# Patient Record
Sex: FEMALE | Race: WHITE | NOT HISPANIC OR LATINO | Employment: UNEMPLOYED | URBAN - METROPOLITAN AREA
[De-identification: names, ages, dates, MRNs, and addresses within clinical notes are randomized per-mention and may not be internally consistent; named-entity substitution may affect disease eponyms.]

---

## 2017-07-17 ENCOUNTER — PROCEDURE VISIT (OUTPATIENT)
Dept: OBSTETRICS AND GYNECOLOGY | Facility: CLINIC | Age: 22
End: 2017-07-17

## 2017-07-17 ENCOUNTER — OFFICE VISIT (OUTPATIENT)
Dept: OBSTETRICS AND GYNECOLOGY | Facility: CLINIC | Age: 22
End: 2017-07-17

## 2017-07-17 VITALS
DIASTOLIC BLOOD PRESSURE: 74 MMHG | SYSTOLIC BLOOD PRESSURE: 112 MMHG | HEIGHT: 65 IN | BODY MASS INDEX: 27.69 KG/M2 | WEIGHT: 166.2 LBS

## 2017-07-17 DIAGNOSIS — Z13.9 SCREENING: Primary | ICD-10-CM

## 2017-07-17 DIAGNOSIS — O36.80X0 ENCOUNTER TO DETERMINE FETAL VIABILITY OF PREGNANCY, NOT APPLICABLE OR UNSPECIFIED FETUS: Primary | ICD-10-CM

## 2017-07-17 DIAGNOSIS — N91.2 AMENORRHEA: ICD-10-CM

## 2017-07-17 LAB
B-HCG UR QL: POSITIVE
BILIRUB BLD-MCNC: NEGATIVE MG/DL
CLARITY, POC: CLEAR
COLOR UR: YELLOW
GLUCOSE UR STRIP-MCNC: ABNORMAL MG/DL
HCG INTACT+B SERPL-ACNC: 855.5 MIU/ML
INTERNAL NEGATIVE CONTROL: NEGATIVE
INTERNAL POSITIVE CONTROL: POSITIVE
KETONES UR QL: NEGATIVE
LEUKOCYTE EST, POC: NEGATIVE
Lab: ABNORMAL
NITRITE UR-MCNC: NEGATIVE MG/ML
PH UR: 5 [PH] (ref 5–8)
PROT UR STRIP-MCNC: NEGATIVE MG/DL
RBC # UR STRIP: NEGATIVE /UL
SP GR UR: 1 (ref 1–1.03)
UROBILINOGEN UR QL: NORMAL

## 2017-07-17 PROCEDURE — 99213 OFFICE O/P EST LOW 20 MIN: CPT | Performed by: NURSE PRACTITIONER

## 2017-07-17 PROCEDURE — 81025 URINE PREGNANCY TEST: CPT | Performed by: NURSE PRACTITIONER

## 2017-07-17 PROCEDURE — 76817 TRANSVAGINAL US OBSTETRIC: CPT | Performed by: NURSE PRACTITIONER

## 2017-07-17 RX ORDER — PRENATAL VIT NO.126/IRON/FOLIC 28MG-0.8MG
TABLET ORAL DAILY
COMMUNITY
End: 2018-03-02

## 2017-07-17 NOTE — PROGRESS NOTES
"Confirmation of pregnancy     .  Chief Complaint   Patient presents with   • Amenorrhea         Yasmin Escalante is being seen today for her first obstetrical visit.  She is a 22 y.o.   Gestational Age: <None> gestation. [facility] 17.     Current obstetric complaints : fatigue, \" not feeling like self\", breast tenderness, nausea and hot flashes   Prior obstetric issues, potential pregnancy concerns: h/o complete AB   Family history of genetic issues (includes FOB):denies   Prior infections concerning in pregnancy (Rash, fever in last 2 weeks): denies   Varicella Hx -as child  Flu vaccine- no   Prior testing for Cystic Fibrosis Carrier or Sickle Cell Trait- no  Prepregnancy BMI - Body mass index is 27.66 kg/(m^2).    History reviewed. No pertinent past medical history.    Past Surgical History:   Procedure Laterality Date   • CHOLECYSTECTOMY     • WISDOM TOOTH EXTRACTION           Current Outpatient Prescriptions:   •  Prenatal Vit-Fe Fumarate-FA (PRENATAL, CLASSIC, VITAMIN) 28-0.8 MG tablet tablet, Take  by mouth Daily., Disp: , Rfl:     Allergies   Allergen Reactions   • Corticosteroids Mental Status Change       Social History     Social History   • Marital status: Single     Spouse name: N/A   • Number of children: N/A   • Years of education: N/A     Occupational History   • Not on file.     Social History Main Topics   • Smoking status: Never Smoker   • Smokeless tobacco: Not on file   • Alcohol use No   • Drug use: No   • Sexual activity: Yes     Partners: Male     Other Topics Concern   • Not on file     Social History Narrative   • No narrative on file       History reviewed. No pertinent family history.    Review of systems     A comprehensive review of systems was negative except for: Constitutional: positive for fatigue and \"not feeling like self\"  Gastrointestinal: positive for nausea  Integument/breast: positive for breast tenderness  Endocrine: positive for temperature " "intolerance    Objective    /74  Ht 65\" (165.1 cm)  Wt 166 lb 3.2 oz (75.4 kg)  LMP 06/07/2017 (Exact Date)  Breastfeeding? No  BMI 27.66 kg/m2      Assessment    1) Amenorrhea- +UPT. TVUS= No gestational sac noted. (L) ovarian cyst. Thickened endometrial lining. Check quant Hcg and repeat lab in 48 hours. Warn s/s disc.  2) Nausea- disc ways to decrease N/V   3) History of SAB    Plan    Patient is on Prenatal vitamins  Problem list reviewed and updated.  Reviewed routine prenatal care with the patient  Zika (travel restrictions/ok to use insect repellant), not to changing cat litter, food restrictions, avoidance of alcohol, tobacco and drugs and saunas/hot tubs.   All questions answered.     Encounter Diagnoses   Name Primary?   • Screening Yes   • Amenorrhea        Diagnoses and all orders for this visit:    Screening  -     POC Pregnancy, Urine  -     POC Urinalysis Dipstick    Amenorrhea  -     HCG, B-subunit, Quantitative    Other orders  -     Prenatal Vit-Fe Fumarate-FA (PRENATAL, CLASSIC, VITAMIN) 28-0.8 MG tablet tablet; Take  by mouth Daily.      Mi Alcazar, APRN  7/17/2017  4:08 PM      "

## 2017-07-27 PROBLEM — N91.2 AMENORRHEA: Status: ACTIVE | Noted: 2017-07-27

## 2017-07-31 ENCOUNTER — PROCEDURE VISIT (OUTPATIENT)
Dept: OBSTETRICS AND GYNECOLOGY | Facility: CLINIC | Age: 22
End: 2017-07-31

## 2017-07-31 ENCOUNTER — TELEPHONE (OUTPATIENT)
Dept: OBSTETRICS AND GYNECOLOGY | Facility: CLINIC | Age: 22
End: 2017-07-31

## 2017-07-31 ENCOUNTER — ROUTINE PRENATAL (OUTPATIENT)
Dept: OBSTETRICS AND GYNECOLOGY | Facility: CLINIC | Age: 22
End: 2017-07-31

## 2017-07-31 VITALS — DIASTOLIC BLOOD PRESSURE: 70 MMHG | BODY MASS INDEX: 27.46 KG/M2 | WEIGHT: 165 LBS | SYSTOLIC BLOOD PRESSURE: 110 MMHG

## 2017-07-31 DIAGNOSIS — O36.80X0 ENCOUNTER TO DETERMINE FETAL VIABILITY OF PREGNANCY, NOT APPLICABLE OR UNSPECIFIED FETUS: Primary | ICD-10-CM

## 2017-07-31 DIAGNOSIS — Z3A.01 LESS THAN 8 WEEKS GESTATION OF PREGNANCY: Primary | ICD-10-CM

## 2017-07-31 DIAGNOSIS — O21.9 NAUSEA AND VOMITING DURING PREGNANCY PRIOR TO 22 WEEKS GESTATION: ICD-10-CM

## 2017-07-31 PROBLEM — O09.299 HISTORY OF MISCARRIAGE, CURRENTLY PREGNANT: Status: ACTIVE | Noted: 2017-07-31

## 2017-07-31 LAB
EXTERNAL ABO GROUPING: NORMAL
EXTERNAL CHLAMYDIA SCREEN: NEGATIVE
EXTERNAL CYSTIC FIBROSIS: NEGATIVE
EXTERNAL GC/CHLAMYDIA: NORMAL
EXTERNAL GONORRHEA SCREEN: NEGATIVE
EXTERNAL HEPATITIS B SURFACE ANTIGEN: NEGATIVE
EXTERNAL HEPATITIS C AB: NEGATIVE
EXTERNAL RH FACTOR: POSITIVE
EXTERNAL RUBELLA QUALITATIVE: NORMAL
EXTERNAL SYPHILIS RPR SCREEN: NORMAL
EXTERNAL URINE CULTURE: NORMAL
HIV1 P24 AG SERPL QL IA: NEGATIVE

## 2017-07-31 PROCEDURE — 76817 TRANSVAGINAL US OBSTETRIC: CPT | Performed by: NURSE PRACTITIONER

## 2017-07-31 PROCEDURE — 99213 OFFICE O/P EST LOW 20 MIN: CPT | Performed by: NURSE PRACTITIONER

## 2017-07-31 RX ORDER — ONDANSETRON 4 MG/1
4 TABLET, FILM COATED ORAL EVERY 8 HOURS PRN
Qty: 30 TABLET | Refills: 1 | Status: SHIPPED | OUTPATIENT
Start: 2017-07-31 | End: 2017-08-08 | Stop reason: HOSPADM

## 2017-07-31 RX ORDER — DOXYLAMINE SUCCINATE AND PYRIDOXINE HYDROCHLORIDE, DELAYED RELEASE TABLETS 10 MG/10 MG 10; 10 MG/1; MG/1
TABLET, DELAYED RELEASE ORAL
Qty: 120 TABLET | Refills: 2 | Status: SHIPPED | OUTPATIENT
Start: 2017-07-31 | End: 2017-12-20 | Stop reason: SDUPTHER

## 2017-08-02 LAB
BACTERIA UR CULT: NO GROWTH
BACTERIA UR CULT: NORMAL
C TRACH RRNA CVX QL NAA+PROBE: NEGATIVE
CONV .: NORMAL
CYTOLOGIST CVX/VAG CYTO: NORMAL
CYTOLOGY CVX/VAG DOC THIN PREP: NORMAL
DX ICD CODE: NORMAL
HIV 1 & 2 AB SER-IMP: NORMAL
N GONORRHOEA RRNA CVX QL NAA+PROBE: NEGATIVE
OTHER STN SPEC: NORMAL
PATH REPORT.FINAL DX SPEC: NORMAL
STAT OF ADQ CVX/VAG CYTO-IMP: NORMAL

## 2017-08-04 PROBLEM — O21.9 NAUSEA AND VOMITING DURING PREGNANCY PRIOR TO 22 WEEKS GESTATION: Status: ACTIVE | Noted: 2017-08-04

## 2017-08-04 PROBLEM — Z3A.01 LESS THAN 8 WEEKS GESTATION OF PREGNANCY: Status: ACTIVE | Noted: 2017-08-04

## 2017-08-04 NOTE — PROGRESS NOTES
Initial ob visit     Chief Complaint   Patient presents with   • Initial Prenatal Visit       Yasmin Escalante is being seen today for her first obstetrical visit.  She is a 22 y.o.    8w2d gestation.     #: 1, Date: None, Sex: None, Weight: None, GA: None, Delivery: None, Apgar1: None, Apgar5: None, Living: None, Birth Comments: None    #: 2, Date: None, Sex: None, Weight: None, GA: None, Delivery: None, Apgar1: None, Apgar5: None, Living: None, Birth Comments: None      Prepregnancy BMI - Body mass index is 27.46 kg/(m^2).    No past medical history on file.    Past Surgical History:   Procedure Laterality Date   • CHOLECYSTECTOMY     • WISDOM TOOTH EXTRACTION           Current Outpatient Prescriptions:   •  doxylamine-pyridoxine (DICLEGIS) 10-10 MG tablet delayed-release EC tablet, Take 2 tablets at bedtime. Take 1 tablet in the morning. Take 1 tablet in the afternoon., Disp: 120 tablet, Rfl: 2  •  ondansetron (ZOFRAN) 4 MG tablet, Take 1 tablet by mouth Every 8 (Eight) Hours As Needed for Nausea or Vomiting., Disp: 30 tablet, Rfl: 01  •  Prenatal Vit-Fe Fumarate-FA (PRENATAL, CLASSIC, VITAMIN) 28-0.8 MG tablet tablet, Take  by mouth Daily., Disp: , Rfl:     Allergies   Allergen Reactions   • Corticosteroids Mental Status Change       Social History     Social History   • Marital status: Single     Spouse name: N/A   • Number of children: N/A   • Years of education: N/A     Occupational History   • Not on file.     Social History Main Topics   • Smoking status: Never Smoker   • Smokeless tobacco: Not on file   • Alcohol use No   • Drug use: No   • Sexual activity: Yes     Partners: Male     Other Topics Concern   • Not on file     Social History Narrative       No family history on file.    Review of systems     A comprehensive review of systems was negative except for: Constitutional: positive for fatigue  Gastrointestinal: positive for nausea     Objective    /70  Wt 165 lb (74.8 kg)  LMP  06/07/2017 (Exact Date)  BMI 27.46 kg/m2      General Appearance:    Alert, cooperative, in no acute distress, habitus normal   Head:    Not examined   Eyes:           Not examined   Ears:  Not examined       Neck: Not examined    Back:     No kyphosis present, no scoliosis present,                       Lungs:     Clear to auscultation,respirations regular, even and                   unlabored    Heart:    Regular rhythm and normal rate, normal S1 and S2, no            murmur, no gallop, no rub, no click   Breast Exam:    No masses, No nipple discharge   Abdomen:     Normal bowel sounds, no masses, no organomegaly, soft        non-tender, non-distended, no guarding, no rebound                 tenderness   Genitalia:    Vulva - No masses, no atrophy, no lesions    Vagina - No discharge, No bleeding    Cervix - No Lesions, closed. Pap collected.      Uterus - Consistent with 8 weeks.     Adnexa - No masses, non tender       Extremities:   Moves all extremities well, no edema, no cyanosis, no              redness   Pulses:   Pulses palpable and equal bilaterally   Skin:   No bleeding, bruising or rash   Lymph nodes:   No palpable adenopathy   Neurologic:   Sensation intact, A&O times 3      Assessment    1) Pregnancy at 8w2d  2) Nauesa  3) H/O SAB     Plan  New OB exam completed, pap collected   Initial labs collected  Patient is taking Prenatal vitamins  ERX Diclegis. Disc ways to decrease nausea and vomiting   Problem list reviewed and updated  Reviewed routine prenatal care with the office to include but not limited to expected weight gain during pregnancy, Tylenol products are fine, avoid aspirin and ibuprofen; Zika (travel restrictions/ok to use insect repellant); not to change cat litter; food restrictions; avoidance of alcohol, tobacco, drugs and saunas/hot tubs.   All questions answered.   RTO 4 weeks OB amrit Alcazar, ARIES    8/4/2017  2:52 PM

## 2017-08-06 ENCOUNTER — HOSPITAL ENCOUNTER (OUTPATIENT)
Facility: HOSPITAL | Age: 22
Setting detail: OBSERVATION
Discharge: HOME | End: 2017-08-08
Attending: EMERGENCY MEDICINE | Admitting: OBSTETRICS & GYNECOLOGY

## 2017-08-06 DIAGNOSIS — O21.1 HYPEREMESIS GRAVIDARUM BEFORE END OF 22 WEEK GESTATION, DEHYDRATION: Primary | ICD-10-CM

## 2017-08-06 PROCEDURE — 99284 EMERGENCY DEPT VISIT MOD MDM: CPT | Performed by: EMERGENCY MEDICINE

## 2017-08-06 PROCEDURE — 96374 THER/PROPH/DIAG INJ IV PUSH: CPT

## 2017-08-06 PROCEDURE — 99283 EMERGENCY DEPT VISIT LOW MDM: CPT

## 2017-08-06 PROCEDURE — 81003 URINALYSIS AUTO W/O SCOPE: CPT | Performed by: EMERGENCY MEDICINE

## 2017-08-06 PROCEDURE — 84702 CHORIONIC GONADOTROPIN TEST: CPT | Performed by: EMERGENCY MEDICINE

## 2017-08-06 PROCEDURE — 25010000002 ONDANSETRON PER 1 MG: Performed by: EMERGENCY MEDICINE

## 2017-08-06 PROCEDURE — 96361 HYDRATE IV INFUSION ADD-ON: CPT

## 2017-08-06 PROCEDURE — 80053 COMPREHEN METABOLIC PANEL: CPT | Performed by: EMERGENCY MEDICINE

## 2017-08-06 PROCEDURE — 83690 ASSAY OF LIPASE: CPT | Performed by: EMERGENCY MEDICINE

## 2017-08-06 PROCEDURE — 85025 COMPLETE CBC W/AUTO DIFF WBC: CPT | Performed by: EMERGENCY MEDICINE

## 2017-08-06 PROCEDURE — 82150 ASSAY OF AMYLASE: CPT | Performed by: EMERGENCY MEDICINE

## 2017-08-06 RX ORDER — ONDANSETRON 2 MG/ML
4 INJECTION INTRAMUSCULAR; INTRAVENOUS ONCE
Status: COMPLETED | OUTPATIENT
Start: 2017-08-06 | End: 2017-08-06

## 2017-08-06 RX ORDER — SODIUM CHLORIDE 0.9 % (FLUSH) 0.9 %
10 SYRINGE (ML) INJECTION AS NEEDED
Status: DISCONTINUED | OUTPATIENT
Start: 2017-08-06 | End: 2017-08-08 | Stop reason: HOSPADM

## 2017-08-06 RX ADMIN — SODIUM CHLORIDE 1000 ML: 900 INJECTION, SOLUTION INTRAVENOUS at 23:57

## 2017-08-06 RX ADMIN — ONDANSETRON 4 MG: 2 INJECTION, SOLUTION INTRAMUSCULAR; INTRAVENOUS at 23:57

## 2017-08-07 PROBLEM — O21.1 HYPEREMESIS GRAVIDARUM BEFORE END OF 22 WEEK GESTATION, DEHYDRATION: Status: ACTIVE | Noted: 2017-08-07

## 2017-08-07 PROBLEM — Z3A.01 LESS THAN 8 WEEKS GESTATION OF PREGNANCY: Status: RESOLVED | Noted: 2017-08-04 | Resolved: 2017-08-07

## 2017-08-07 LAB
ABO GROUP BLD: (no result)
ALBUMIN SERPL-MCNC: 4.2 G/DL (ref 3.5–5.2)
ALBUMIN/GLOB SERPL: 1.3 G/DL
ALP SERPL-CCNC: 37 U/L (ref 40–129)
ALT SERPL W P-5'-P-CCNC: 15 U/L (ref 5–33)
AMYLASE SERPL-CCNC: 81 U/L (ref 28–100)
ANION GAP SERPL CALCULATED.3IONS-SCNC: 14.6 MMOL/L
AST SERPL-CCNC: 13 U/L (ref 5–32)
BASOPHILS # BLD AUTO: 0 X10E3/UL (ref 0–0.2)
BASOPHILS # BLD AUTO: 0.04 10*3/MM3 (ref 0–0.2)
BASOPHILS NFR BLD AUTO: 0 %
BASOPHILS NFR BLD AUTO: 0.4 % (ref 0–2)
BILIRUB SERPL-MCNC: 0.5 MG/DL (ref 0.2–1.2)
BILIRUB UR QL STRIP: ABNORMAL
BLD GP AB SCN SERPL QL: NEGATIVE
BUN BLD-MCNC: 9 MG/DL (ref 6–20)
BUN/CREAT SERPL: 15.8 (ref 7–25)
CALCIUM SPEC-SCNC: 9.1 MG/DL (ref 8.6–10.5)
CFTR MUT ANL BLD/T: NORMAL
CHLORIDE SERPL-SCNC: 101 MMOL/L (ref 98–107)
CLARITY UR: CLEAR
CO2 SERPL-SCNC: 22.4 MMOL/L (ref 22–29)
COLOR UR: YELLOW
CONV COMMENT: NORMAL
CREAT BLD-MCNC: 0.57 MG/DL (ref 0.57–1)
DEPRECATED RDW RBC AUTO: 37.9 FL (ref 37–54)
EOSINOPHIL # BLD AUTO: 0 X10E3/UL (ref 0–0.4)
EOSINOPHIL # BLD AUTO: 0.05 10*3/MM3 (ref 0.1–0.3)
EOSINOPHIL NFR BLD AUTO: 0.5 % (ref 0–4)
EOSINOPHIL NFR BLD AUTO: 1 %
ERYTHROCYTE [DISTWIDTH] IN BLOOD BY AUTOMATED COUNT: 11.8 % (ref 11.5–14.5)
ERYTHROCYTE [DISTWIDTH] IN BLOOD BY AUTOMATED COUNT: 12.6 % (ref 12.3–15.4)
GFR SERPL CREATININE-BSD FRML MDRD: 131 ML/MIN/1.73
GLOBULIN UR ELPH-MCNC: 3.2 GM/DL
GLUCOSE BLD-MCNC: 85 MG/DL (ref 65–99)
GLUCOSE UR STRIP-MCNC: NEGATIVE MG/DL
HBA1C MFR BLD: 4.5 % (ref 4.8–5.6)
HBV SURFACE AG SERPL QL IA: NEGATIVE
HCG INTACT+B SERPL-ACNC: NORMAL MIU/ML
HCT VFR BLD AUTO: 37.6 % (ref 34–46.6)
HCT VFR BLD AUTO: 37.6 % (ref 37–47)
HCV AB S/CO SERPL IA: <0.1 S/CO RATIO (ref 0–0.9)
HGB BLD-MCNC: 12.8 G/DL (ref 11.1–15.9)
HGB BLD-MCNC: 13 G/DL (ref 12–16)
HGB UR QL STRIP.AUTO: NEGATIVE
HIV 1+2 AB+HIV1 P24 AG SERPL QL IA: NON REACTIVE
IMM GRANULOCYTES # BLD: 0 X10E3/UL (ref 0–0.1)
IMM GRANULOCYTES # BLD: 0.03 10*3/MM3 (ref 0–0.03)
IMM GRANULOCYTES NFR BLD: 0 %
IMM GRANULOCYTES NFR BLD: 0.3 % (ref 0–0.5)
KETONES UR QL STRIP: ABNORMAL
LEUKOCYTE ESTERASE UR QL STRIP.AUTO: NEGATIVE
LIPASE SERPL-CCNC: 25 U/L (ref 13–60)
LYMPHOCYTES # BLD AUTO: 2.3 X10E3/UL (ref 0.7–3.1)
LYMPHOCYTES # BLD AUTO: 2.88 10*3/MM3 (ref 0.6–4.8)
LYMPHOCYTES NFR BLD AUTO: 26 %
LYMPHOCYTES NFR BLD AUTO: 27.8 % (ref 20–45)
MCH RBC QN AUTO: 30.1 PG (ref 26.6–33)
MCH RBC QN AUTO: 30.2 PG (ref 27–31)
MCHC RBC AUTO-ENTMCNC: 34 G/DL (ref 31.5–35.7)
MCHC RBC AUTO-ENTMCNC: 34.6 G/DL (ref 31–37)
MCV RBC AUTO: 87.4 FL (ref 81–99)
MCV RBC AUTO: 89 FL (ref 79–97)
MONOCYTES # BLD AUTO: 0.7 X10E3/UL (ref 0.1–0.9)
MONOCYTES # BLD AUTO: 0.71 10*3/MM3 (ref 0–1)
MONOCYTES NFR BLD AUTO: 6.9 % (ref 3–8)
MONOCYTES NFR BLD AUTO: 8 %
NEUTROPHILS # BLD AUTO: 5.6 X10E3/UL (ref 1.4–7)
NEUTROPHILS # BLD AUTO: 6.65 10*3/MM3 (ref 1.5–8.3)
NEUTROPHILS NFR BLD AUTO: 64.1 % (ref 45–70)
NEUTROPHILS NFR BLD AUTO: 65 %
NITRITE UR QL STRIP: NEGATIVE
NRBC BLD MANUAL-RTO: 0 /100 WBC (ref 0–0)
PH UR STRIP.AUTO: 5.5 [PH] (ref 4.5–8)
PLATELET # BLD AUTO: 276 10*3/MM3 (ref 140–500)
PLATELET # BLD AUTO: 285 X10E3/UL (ref 150–379)
PMV BLD AUTO: 10.4 FL (ref 7.4–10.4)
POTASSIUM BLD-SCNC: 3.5 MMOL/L (ref 3.5–5.2)
PROT SERPL-MCNC: 7.4 G/DL (ref 6–8.5)
PROT UR QL STRIP: ABNORMAL
RBC # BLD AUTO: 4.25 X10E6/UL (ref 3.77–5.28)
RBC # BLD AUTO: 4.3 10*6/MM3 (ref 4.2–5.4)
RH BLD: POSITIVE
RPR SER QL: NON REACTIVE
RUBV IGG SERPL IA-ACNC: 1.78 INDEX
SODIUM BLD-SCNC: 138 MMOL/L (ref 136–145)
SP GR UR STRIP: 1.02 (ref 1–1.03)
UROBILINOGEN UR QL STRIP: ABNORMAL
WBC # BLD AUTO: 8.6 X10E3/UL (ref 3.4–10.8)
WBC NRBC COR # BLD: 10.36 10*3/MM3 (ref 4.8–10.8)

## 2017-08-07 PROCEDURE — 96376 TX/PRO/DX INJ SAME DRUG ADON: CPT

## 2017-08-07 PROCEDURE — G0378 HOSPITAL OBSERVATION PER HR: HCPCS

## 2017-08-07 PROCEDURE — 25010000002 ONDANSETRON PER 1 MG: Performed by: OBSTETRICS & GYNECOLOGY

## 2017-08-07 PROCEDURE — 99218 PR INITIAL OBSERVATION CARE/DAY 30 MINUTES: CPT | Performed by: OBSTETRICS & GYNECOLOGY

## 2017-08-07 PROCEDURE — 96361 HYDRATE IV INFUSION ADD-ON: CPT

## 2017-08-07 RX ORDER — DEXTROSE, SODIUM CHLORIDE, SODIUM LACTATE, POTASSIUM CHLORIDE, AND CALCIUM CHLORIDE 5; .6; .31; .03; .02 G/100ML; G/100ML; G/100ML; G/100ML; G/100ML
150 INJECTION, SOLUTION INTRAVENOUS CONTINUOUS
Status: DISCONTINUED | OUTPATIENT
Start: 2017-08-07 | End: 2017-08-08 | Stop reason: HOSPADM

## 2017-08-07 RX ORDER — ACETAMINOPHEN 500 MG
TABLET ORAL
Status: COMPLETED
Start: 2017-08-07 | End: 2017-08-07

## 2017-08-07 RX ORDER — DEXTROSE, SODIUM CHLORIDE, SODIUM LACTATE, POTASSIUM CHLORIDE, AND CALCIUM CHLORIDE 5; .6; .31; .03; .02 G/100ML; G/100ML; G/100ML; G/100ML; G/100ML
INJECTION, SOLUTION INTRAVENOUS
Status: COMPLETED
Start: 2017-08-07 | End: 2017-08-07

## 2017-08-07 RX ORDER — ONDANSETRON 2 MG/ML
4 INJECTION INTRAMUSCULAR; INTRAVENOUS EVERY 4 HOURS PRN
Status: DISCONTINUED | OUTPATIENT
Start: 2017-08-07 | End: 2017-08-08 | Stop reason: HOSPADM

## 2017-08-07 RX ORDER — ACETAMINOPHEN 500 MG
1000 TABLET ORAL EVERY 4 HOURS PRN
Status: DISCONTINUED | OUTPATIENT
Start: 2017-08-07 | End: 2017-08-08 | Stop reason: HOSPADM

## 2017-08-07 RX ADMIN — ONDANSETRON 4 MG: 2 SOLUTION INTRAMUSCULAR; INTRAVENOUS at 14:46

## 2017-08-07 RX ADMIN — SODIUM CHLORIDE, SODIUM LACTATE, POTASSIUM CHLORIDE, CALCIUM CHLORIDE AND DEXTROSE MONOHYDRATE 150 ML/HR: 5; 600; 310; 30; 20 INJECTION, SOLUTION INTRAVENOUS at 14:49

## 2017-08-07 RX ADMIN — SODIUM CHLORIDE, SODIUM LACTATE, POTASSIUM CHLORIDE, CALCIUM CHLORIDE AND DEXTROSE MONOHYDRATE 150 ML/HR: 5; 600; 310; 30; 20 INJECTION, SOLUTION INTRAVENOUS at 21:41

## 2017-08-07 RX ADMIN — SODIUM CHLORIDE, SODIUM LACTATE, POTASSIUM CHLORIDE, CALCIUM CHLORIDE AND DEXTROSE MONOHYDRATE 1000 ML: 5; 600; 310; 30; 20 INJECTION, SOLUTION INTRAVENOUS at 01:29

## 2017-08-07 RX ADMIN — Medication 1000 MG: at 14:47

## 2017-08-07 RX ADMIN — ONDANSETRON 4 MG: 2 SOLUTION INTRAMUSCULAR; INTRAVENOUS at 18:50

## 2017-08-07 RX ADMIN — ONDANSETRON 4 MG: 2 SOLUTION INTRAMUSCULAR; INTRAVENOUS at 10:08

## 2017-08-07 RX ADMIN — ACETAMINOPHEN 1000 MG: 500 TABLET, FILM COATED ORAL at 14:47

## 2017-08-07 RX ADMIN — ONDANSETRON 4 MG: 2 SOLUTION INTRAMUSCULAR; INTRAVENOUS at 05:42

## 2017-08-07 RX ADMIN — ONDANSETRON 4 MG: 2 SOLUTION INTRAMUSCULAR; INTRAVENOUS at 22:50

## 2017-08-07 NOTE — ED PROVIDER NOTES
Subjective     History provided by:  Patient    History of Present Illness    · Chief complaint: Nausea and vomiting    · Location: N/A    · Quality/Severity: Severe nausea and vomiting unable to tolerate any liquids or solids by mouth the past 5 days.    · Timing/Onset: Patient started having morning sickness with nausea and occasional vomiting to 3 weeks ago.  The vomiting became severe the last 5 or 6 days where she did not begin to tolerate anything by mouth.    · Modifying Factors: Zofran tablets as been ineffective.    · Associated symptoms: He denies any abdominal or pelvic pain.    · Narrative: The patient is a 22-year-old white female who is a  2 para 0 AB 1 that is currently 8 weeks pregnant.  She developed morning sickness symptoms about 2-3 weeks ago, and the vomiting has become so severe in the last 56 days that she's not tolerating anything by mouth.    ED Triage Vitals   Temp Heart Rate Resp BP SpO2   17 2339 17 23317 23317   98.3 °F (36.8 °C) 75 18 100/64 99 %      Temp src Heart Rate Source Patient Position BP Location FiO2 (%)   17 2339 17 2339 17 2339 17 233 --   Oral Monitor Sitting Right arm        Review of Systems   Constitutional: Negative for activity change, appetite change, chills, diaphoresis, fatigue and fever.   HENT: Negative for congestion, dental problem, ear pain, hearing loss, mouth sores, postnasal drip, rhinorrhea, sinus pressure, sore throat and voice change.    Eyes: Negative for photophobia, pain, discharge, redness and visual disturbance.   Respiratory: Negative for cough, chest tightness, shortness of breath, wheezing and stridor.    Cardiovascular: Negative for chest pain, palpitations and leg swelling.   Gastrointestinal: Positive for nausea. Negative for abdominal pain, diarrhea and vomiting.   Genitourinary: Negative for difficulty urinating, dysuria, flank pain, frequency, hematuria, pelvic  pain, urgency and vaginal bleeding.   Musculoskeletal: Negative for arthralgias, back pain, gait problem, joint swelling, myalgias, neck pain and neck stiffness.   Skin: Negative for color change and rash.   Neurological: Negative for dizziness, tremors, seizures, syncope, facial asymmetry, speech difficulty, weakness, light-headedness, numbness and headaches.   Hematological: Negative for adenopathy.   Psychiatric/Behavioral: Negative.  Negative for confusion and decreased concentration. The patient is not nervous/anxious.        History reviewed. No pertinent past medical history.    Allergies   Allergen Reactions   • Corticosteroids Mental Status Change       Past Surgical History:   Procedure Laterality Date   • CHOLECYSTECTOMY     • WISDOM TOOTH EXTRACTION         History reviewed. No pertinent family history.    Social History     Social History   • Marital status: Single     Spouse name: N/A   • Number of children: N/A   • Years of education: N/A     Social History Main Topics   • Smoking status: Never Smoker   • Smokeless tobacco: None   • Alcohol use No   • Drug use: No   • Sexual activity: Yes     Partners: Male     Other Topics Concern   • None     Social History Narrative           Objective   Physical Exam   Constitutional: She is oriented to person, place, and time. She appears well-developed and well-nourished. No distress.   HENT:   Head: Normocephalic and atraumatic.   Nose: Nose normal.   Mouth/Throat: Oropharynx is clear and moist. No oropharyngeal exudate.   Eyes: EOM are normal. Pupils are equal, round, and reactive to light. Right eye exhibits no discharge. Left eye exhibits no discharge. No scleral icterus.   Neck: Normal range of motion. Neck supple. No JVD present. No thyromegaly present.   Cardiovascular: Normal rate, regular rhythm and normal heart sounds.    No murmur heard.  Pulmonary/Chest: Effort normal and breath sounds normal. She has no wheezes. She has no rales. She exhibits no  tenderness.   Abdominal: Soft. Bowel sounds are normal. She exhibits no distension. There is no tenderness.   Musculoskeletal: Normal range of motion. She exhibits no edema, tenderness or deformity.   Lymphadenopathy:     She has no cervical adenopathy.   Neurological: She is alert and oriented to person, place, and time. No cranial nerve deficit. Coordination normal.   No focal motor sensory deficit   Skin: Skin is warm and dry. No rash noted. She is not diaphoretic.   Psychiatric: She has a normal mood and affect. Her behavior is normal. Judgment and thought content normal.   Nursing note and vitals reviewed.      Procedures         ED Course  ED Course   Comment By Time   IVFs - NS one liter bolus.  Zofran 4mg IV. Jorden Ahumada MD 08/06 6541   The patient's urine has greater than 160 ketones and is very concentrated with showing the patient is significantly dehydrated.  I believe she would be best managed with a 23 hour observation admission for IV hydration. Jorden Ahumada MD 08/07 0051   00:50 the patient was discussed with Dr. Sarmiento, OB on call, who agreed to admit the patient to observation for hyperemesis gravidarum. Jorden Ahumada MD 08/07 0051                  MDM  Number of Diagnoses or Management Options  Hyperemesis gravidarum before end of 22 week gestation, dehydration: new and requires workup     Amount and/or Complexity of Data Reviewed  Clinical lab tests: ordered and reviewed  Discuss the patient with other providers: yes    Risk of Complications, Morbidity, and/or Mortality  Presenting problems: high  Diagnostic procedures: moderate  Management options: high    Patient Progress  Patient progress: stable      Final diagnoses:   Hyperemesis gravidarum before end of 22 week gestation, dehydration           Labs Reviewed   COMPREHENSIVE METABOLIC PANEL - Abnormal; Notable for the following:        Result Value    Alkaline Phosphatase 37 (*)     All other components within normal limits    URINALYSIS W/ CULTURE IF INDICATED - Abnormal; Notable for the following:     Bilirubin, UA Small (1+) (*)     Protein, UA Trace (*)     All other components within normal limits    Narrative:     Urine microscopic not indicated.   CBC WITH AUTO DIFFERENTIAL - Abnormal; Notable for the following:     Eosinophils, Absolute 0.05 (*)     All other components within normal limits   LIPASE - Normal   AMYLASE - Normal   HCG, QUANTITATIVE, PREGNANCY    Narrative:     HCG Expected Values:     Nonpregnant females:               less than 5 mIU/mL     Males:                             less than 5 mIU/mL    Pregnant females:   0-1 Weeks Gestation                5-50   1-2 Weeks Gestation                   2-3 Weeks Gestation                100-5000   3-4 Weeks Gestation                [phone]  4-5 Weeks Gestation                [phone]   5-6 Weeks Gestation                [phone]   6-8 Weeks Gestation                [phone]   2-3 Months Gestation               [id number]      Note:  If a value is between 5-25 mIU/mL recommend            repeat testing and clinical correlation.   CBC AND DIFFERENTIAL    Narrative:     The following orders were created for panel order CBC & Differential.  Procedure                               Abnormality         Status                     ---------                               -----------         ------                     CBC Auto Differential[[id number]]         Abnormal            Final result                 Please view results for these tests on the individual orders.     No orders to display          Medication List      Notice     No changes were made to your prescriptions during this visit.             Jorden Ahumada MD  08/07/17 0101

## 2017-08-07 NOTE — H&P
BATSHEVA Marshall  Obstetric History and Physical    Chief Complaint   Patient presents with   • Vomiting     pt has had morning sickness 2-3 weeks; increased vomiting for the past 5 days       Subjective     Patient is a 22 y.o. female  currently at 8w5d, who presents with 5 day history of nausea and vomiting refractory to diclegis.  Was seen and evaluated in the ER and decision was made to admit for IV hydration and nausea control.     Her prenatal care is complicated by  hyperemesis gravidarum.  Her previous obstetric/gynecological history is noted for is remarkable for   Patient Active Problem List   Diagnosis   • Amenorrhea   • History of miscarriage, currently pregnant   • Nausea and vomiting during pregnancy prior to 22 weeks gestation   • Hyperemesis gravidarum before end of 22 week gestation, dehydration     .    The following portions of the patients history were reviewed and updated as appropriate: current medications, allergies, past medical history, past surgical history, past family history, past social history and problem list .       Prenatal Information:  Prenatal Results         1st Trimester Ref. Range Date Time   CBC with auto diff       Rubella IgG       Hepatitis B SAg       RPR       ABO       Rh       Anibody Screen       HIV       Varicella IgG       Urinalysis with microscopy       Urine Culture       GC/Chlamydia/TV       ThinPrep/Pap       2nd and 3rd Trimester Ref. Range Date Time   Hemoglobin / Hematocrit  37.6 % 37.0 - 47.0 % 17 2356   Hemoglobin  13.0 g/dL 12.0 - 16.0 g/dL 17 2356   Group B Strep Culture       Glucose Challenge Test 1 Hr       Glucose Fasting       Glucose 1 Hr       Glucose 2 Hr       Glucose 3 Hr       Pre-eclampsia Panel       Risk Screening Ref. Range Date Time   Fetal Fibronectin       Amnisure       Hepatitis C Antibody       Hemoglobin electrophoresis       Cystic Fibrosis       Hemoglobin A1C       MSAFP - 4       NIPT       AFP       Parvovirus  IgG       Parvovirus IgM       POCT - glucose       [facility]       24 Hour urine - Total protein       24 Hour urine - Creatinine clearance       Urinalysis with microscopy       Urine Culture       Drug Screening Ref. Range Date Time   Amphetamine Screen       Barbiturate Screen       Benzodiazepine Screen       Methadone Screen       Phencyclidine Screen       Opiates Screen       THC Screen       Cocaine Screen       Propoxyphene Screen       Buprenorphine Screen       Methamphetamine Screen       Oxycodone Screen       Tryicyclic Antidepressants Screen              Legend: ^: Historical            View all results for this pregnancy             Past OB History:     Obstetric History       T0      TAB0   SAB1   E0   M0   L0       # Outcome Date GA Lbr Ceferino/2nd Weight Sex Delivery Anes PTL Lv   2 Current            1 SAB                   Past Medical History: History reviewed. No pertinent past medical history.   Past Surgical History Past Surgical History:   Procedure Laterality Date   • CHOLECYSTECTOMY     • WISDOM TOOTH EXTRACTION        Family History: History reviewed. No pertinent family history.   Social History:  reports that she has never smoked. She does not have any smokeless tobacco history on file.   reports that she does not drink alcohol.   reports that she does not use illicit drugs.        Review of Systems   Constitutional: Positive for fatigue.   HENT: Negative.    Respiratory: Negative.    Cardiovascular: Negative.    Gastrointestinal: Positive for nausea and vomiting.   Endocrine: Negative.    Genitourinary: Negative.    Neurological: Negative.    Psychiatric/Behavioral: Negative.    All other systems reviewed and are negative.        Objective     Vital Signs Range for the last 24 hours  Temperature: Temp:  [97.7 °F (36.5 °C)-98.3 °F (36.8 °C)] 97.7 °F (36.5 °C)   Temp Source: Temp src: Oral   BP: BP: ()/(45-64) 96/45   Pulse: Heart Rate:  [75-93] 85   Respirations: Resp:   [18] 18   SPO2: SpO2:  [98 %-99 %] 98 %   O2 Amount (l/min):     O2 Devices O2 Device: room air   Weight: Weight:  [166 lb (75.3 kg)] 166 lb (75.3 kg)     Physical Examination: General appearance - alert, well appearing, and in no distress and oriented to person, place, and time  Mental status - alert, oriented to person, place, and time, normal mood, behavior, speech, dress, motor activity, and thought processes, affect appropriate to mood  Chest - clear to auscultation, no wheezes, rales or rhonchi, symmetric air entry  Heart - normal rate, regular rhythm, normal S1, S2, no murmurs, rubs, clicks or gallops  Abdomen - soft, nontender, nondistended, no masses or organomegaly  Back exam - full range of motion, no tenderness, palpable spasm or pain on motion  Neurological - alert, oriented, normal speech, no focal findings or movement disorder noted  Musculoskeletal - no joint tenderness, deformity or swelling  Extremities - peripheral pulses normal, no pedal edema, no clubbing or cyanosis  Skin - normal coloration and turgor, no rashes, no suspicious skin lesions noted        Laboratory Results: reviewed.   Radiology Review: none  Other Studies: none    Assessment/Plan     Active Problems:    Hyperemesis gravidarum before end of 22 week gestation, dehydration      Assessment & Plan    Assessment:  Hyperemesis gravidarum refractory to medical outpt therapy.    Plan:  1. IV hydration. Supportive therapy.       Brian Sarmiento MD  8/7/2017  6:32 AM

## 2017-08-07 NOTE — PROGRESS NOTES
"Adult Nutrition  Assessment/PES    Patient Name:  Yasmin Escalante  YOB: 1995  MRN: [id number]  Admit Date:  8/6/2017    Assessment Date:  8/7/2017        Reason for Assessment       08/07/17 1145    Reason for Assessment    Reason For Assessment/Visit identified at risk by screening criteria   hyperemesis pregnancy    Obstetrical Diagnosis Pregnancy   hyperemesis , 8 weeks             Data Eval/ Notes       08/07/17 1145     Notes    Note Reports intolerance of food past 5 days, even if sounds good & she eats it like [employer], comes back up. Reports # PTA, wt here is stated per pt.             Nutrition/Diet History       08/07/17 1146    Nutrition/Diet History    Patient Reported Diet/Restrictions/Preferences regular    Factors Affecting Nutritional Intake Factors    Reported GI Symptoms Nausea and vomiting            Anthropometrics       08/07/17 1146    Anthropometrics    RD Documented Current Weight  75.3 kg (166 lb)    Anthropometrics (Special Considerations)    RD Calculated BMI (kg/m2) 27.6   N/A with pregnancy            Labs/Tests/Procedures/Meds       08/07/17 1147    Labs/Tests/Procedures/Meds    Labs/Tests Review Reviewed    Medication Review Reviewed, pertinent              Estimated/Assessed Needs       08/07/17 1147    Calculation Measurements    Weight Used For Calculations 75.3 kg (166 lb)    Height Used for Calculations 1.651 m (5' 5\")    Estimated/Assessed Energy Needs    Energy Need Method Gary-St Jeor    Age 22    RMR (Gary-St. Jeor Equation) 1508.84    Activity Factors (Gary St or)  Out of bed, ambulatory  1.3    Estimated Kcal Range  1960 kcal     Estimated/Assessed Protein Needs    Weight Used for Protein Calculation 75.3 kg (166 lb 0.1 oz)    Protein (gm/kg) 1.0    1.0 Gm Protein (gm) 75.3    Estimated Protein Range 75 gm     Estimated/Assessed Fluid Needs    Fluid Need Method RDA method    RDA Method (mL)  1960          "   Nutrition Prescription Ordered       08/07/17 1149    Nutrition Prescription PO    Current PO Diet Clear Liquid            Evaluation of Received Nutrient/Fluid Intake       08/07/17 1149    Evaluation of Received Nutrient/Fluid Intake    Nutrition Delivered Fluid Evaluation    Fluid Intake Evaluation    % Fluid Needs not available recent admit overnight    PO Evaluation    Number of Days PO Intake Evaluated Insufficient Data              Problem/Interventions:        Problem 1       08/07/17 1150    Nutrition Diagnoses Problem 1    Problem 1 Altered GI Function    Etiology (related to) Medical Diagnosis    Gastrointestinal Hyperemesis    Signs/Symptoms (evidenced by) Report/Observation                    Intervention Goal       08/07/17 1150    Intervention Goal    General Meet nutritional needs for age/condition    PO Tolerate PO;PO intake (%)    PO Intake % 50 %            Nutrition Intervention       08/07/17 1151    Nutrition Intervention    RD/Tech Action Follow Tx progress;Interview for preference            Nutrition Prescription       08/07/17 1151    Nutrition Prescription PO    PO Prescription Begin/change diet   Advance diet as indicated and tolerated            Education/Evaluation       08/07/17 1151    Education    Education Education topics   Discussed small amounts more often, eating before getting out of bed, sticking to dry/cold foods and/or limited aroma    Education Topics Hyperemesis   Mom to Be Tips including N/V handout provided    Monitor/Evaluation    Monitor Per protocol;I&O;PO intake;Pertinent labs;Weight;Symptoms        Comments:    Advance diet as indicated /tolerated.    Will cont to follow and monitor.     Electronically signed by:  Funmilayo Nuno RD  08/07/17 11:54 AM

## 2017-08-07 NOTE — PLAN OF CARE
Problem: Nausea/Vomiting in Pregnancy, Includes Hyperemesis (Adult,Obstetrics,Pediatric)  Goal: Signs and Symptoms of Listed Potential Problems Will be Absent or Manageable (Nausea/Vomiting in Pregnancy, Includes Hyperemesis)  Outcome: Ongoing (interventions implemented as appropriate)    08/07/17 0149   Nausea/Vomiting in Pregnancy, Includes Hyperemesis   Problems Assessed (Nausea/Vomiting/Hyperemesis in Pregnancy) all   Problems Present (Nausea/Vomiting/Hyperemesis in Pregnancy) nutritional deficiency/inadequate oral intake;fluid/electrolyte imbalance

## 2017-08-07 NOTE — PLAN OF CARE
Problem: Patient Care Overview (Adult)  Goal: Plan of Care Review  Outcome: Ongoing (interventions implemented as appropriate)    08/07/17 4554   Coping/Psychosocial Response Interventions   Plan Of Care Reviewed With patient   Patient Care Overview   Progress progress toward functional goals as expected   Outcome Evaluation   Outcome Summary/Follow up Plan VSS, regular diet as tolerated, iv medication for nausea/vomiting, no vomiting since 0100 this morning.       Goal: Adult Individualization and Mutuality  Outcome: Ongoing (interventions implemented as appropriate)  Goal: Discharge Needs Assessment  Outcome: Ongoing (interventions implemented as appropriate)    Problem: Nausea/Vomiting in Pregnancy, Includes Hyperemesis (Adult,Obstetrics,Pediatric)  Goal: Signs and Symptoms of Listed Potential Problems Will be Absent or Manageable (Nausea/Vomiting in Pregnancy, Includes Hyperemesis)  Outcome: Ongoing (interventions implemented as appropriate)

## 2017-08-08 VITALS
SYSTOLIC BLOOD PRESSURE: 94 MMHG | HEIGHT: 65 IN | WEIGHT: 166 LBS | BODY MASS INDEX: 27.66 KG/M2 | TEMPERATURE: 98.5 F | HEART RATE: 79 BPM | DIASTOLIC BLOOD PRESSURE: 53 MMHG | RESPIRATION RATE: 16 BRPM | OXYGEN SATURATION: 99 %

## 2017-08-08 PROBLEM — N91.2 AMENORRHEA: Status: RESOLVED | Noted: 2017-07-27 | Resolved: 2017-08-08

## 2017-08-08 PROCEDURE — 96376 TX/PRO/DX INJ SAME DRUG ADON: CPT

## 2017-08-08 PROCEDURE — 25010000002 ONDANSETRON PER 1 MG: Performed by: OBSTETRICS & GYNECOLOGY

## 2017-08-08 PROCEDURE — 99217 PR OBSERVATION CARE DISCHARGE MANAGEMENT: CPT | Performed by: OBSTETRICS & GYNECOLOGY

## 2017-08-08 PROCEDURE — G0378 HOSPITAL OBSERVATION PER HR: HCPCS

## 2017-08-08 PROCEDURE — 96361 HYDRATE IV INFUSION ADD-ON: CPT

## 2017-08-08 RX ORDER — PROMETHAZINE HYDROCHLORIDE 12.5 MG/1
12.5 TABLET ORAL EVERY 6 HOURS PRN
Qty: 30 TABLET | Refills: 1 | Status: SHIPPED | OUTPATIENT
Start: 2017-08-08 | End: 2018-01-03 | Stop reason: HOSPADM

## 2017-08-08 RX ORDER — ONDANSETRON 4 MG/1
4 TABLET, ORALLY DISINTEGRATING ORAL EVERY 8 HOURS PRN
Qty: 30 TABLET | Refills: 1 | Status: SHIPPED | OUTPATIENT
Start: 2017-08-08 | End: 2018-01-03 | Stop reason: ALTCHOICE

## 2017-08-08 RX ADMIN — SODIUM CHLORIDE, SODIUM LACTATE, POTASSIUM CHLORIDE, CALCIUM CHLORIDE AND DEXTROSE MONOHYDRATE 150 ML/HR: 5; 600; 310; 30; 20 INJECTION, SOLUTION INTRAVENOUS at 03:11

## 2017-08-08 RX ADMIN — ONDANSETRON 4 MG: 2 SOLUTION INTRAMUSCULAR; INTRAVENOUS at 08:39

## 2017-08-08 RX ADMIN — ONDANSETRON 4 MG: 2 SOLUTION INTRAMUSCULAR; INTRAVENOUS at 03:10

## 2017-08-08 NOTE — DISCHARGE SUMMARY
Date of Admission: 2017 11:29 PM      Date of Discharge:  2017    Admitting Service: [facility]    Admission Diagnosis:   1. 23 yo  with IUP @ 8.6 weeks with hyperemesis    Discharge Diagnosis:   Same    Presenting Problem/History of Present Illness  Hyperemesis gravidarum before end of 22 week gestation, dehydration [O21.1]     Hospital Course  Patient is a 22 y.o. female presented with IUP @ 8.6 weeks and a h/o nausea and vomiting for several days. The patient had been taking Diclegis with some benefit. She ran out of Diclegis and started Zofran tablets without relief. She came to the ER and was given IVF and IV Zofran and had significant improvement.  She was able to tolerate a regular diet without nausea or vomiting and wants to go home. We discussed how to make a Diclegis substitute OTC if needed and have changed her to ODT Zofran.   She is to see us in 1-2 weeks for US and visit. She has been advised to call for any additional inability to tolerate p.o.     Procedures Performed         Consults:   Consults     No orders found from 2017 to 2017.          Pertinent Test Results: labs: normal CMP & CBC    Condition on Discharge:  good    Vital Signs  Temp:  [98.2 °F (36.8 °C)-98.7 °F (37.1 °C)] 98.5 °F (36.9 °C)  Heart Rate:  [79-81] 79  Resp:  [16-18] 16  BP: ()/(50-54) 94/53    Physical Exam:     General Appearance:    Alert, cooperative, in no acute distress, in bed, smiling   Head:    Normocephalic, without obvious abnormality, atraumatic   Eyes:            Conjunctivae and sclerae normal, no   icterus, no pallor, corneas clear, PERRLA   Ears:    NE   Throat:   No oral lesions, no thrush, oral mucosa moist   Neck:   No adenopathy, supple, trachea midline, no thyromegaly   Back:     No kyphosis present, no scoliosis present, no skin lesions,       erythema or scars, no tenderness to percussion or                   palpation,   range of motion normal   Lungs:     Clear to  auscultation,respirations regular, even and                   unlabored    Heart:    Regular rhythm and normal rate, normal S1 and S2, no            murmur, no gallop, no rub, no click   Breast Exam:    Deferred   Abdomen:     Normal bowel sounds, no masses, no organomegaly, soft        non-tender, non-distended, no guarding, no rebound                 tenderness   Genitalia:    Deferred   Extremities:   Moves all extremities well, no edema, no cyanosis, no              redness   Pulses:   Pulses palpable and equal bilaterally   Skin:   No bleeding, bruising or rash   Lymph nodes:   No palpable adenopathy   Neurologic:   Cranial nerves 2 - 12 grossly intact, sensation intact         Discharge DispositionHome or Self Care    Discharge Medications   Yasmin Escalante   Home Medication Instructions MONICA:[id number]    Printed on:08/08/17 0848   Medication Information                      doxylamine-pyridoxine (DICLEGIS) 10-10 MG tablet delayed-release EC tablet  Take 2 tablets at bedtime. Take 1 tablet in the morning. Take 1 tablet in the afternoon.             ondansetron ODT (ZOFRAN ODT) 4 MG disintegrating tablet  Take 1 tablet by mouth Every 8 (Eight) Hours As Needed for Nausea or Vomiting.             Prenatal Vit-Fe Fumarate-FA (PRENATAL, CLASSIC, VITAMIN) 28-0.8 MG tablet tablet  Take  by mouth Daily.             promethazine (PHENERGAN) 12.5 MG tablet  Take 1 tablet by mouth Every 6 (Six) Hours As Needed for Nausea or Vomiting.                 Discharge Diet:     Activity at Discharge:   Activity Instructions     Activity as Tolerated                     Follow-up Appointments  Future Appointments  Date Time Provider Department Center   8/16/2017 9:00 AM [facility]TRASOUND ROBIN ALLEN [facility] [place]   9/1/2017 9:30 AM Jeremías Ramirez MD [facility] None     Additional Instructions for the Follow-ups that You Need to Schedule     Call MD With Problems / Concerns    As directed    Instructions: call  for temp > 101, persistent nausea, vomiting, vaginal bleeding or general worsening of condition       Discharge Follow-up with Specialty    As directed    Specialty:  OB GYN   Follow Up Details:  please add MD visit to her US appt in August                 Test Results Pending at Discharge       Lila Perez MD  08/08/17  8:48 AM    Time: Discharge 25 min

## 2017-08-16 ENCOUNTER — ROUTINE PRENATAL (OUTPATIENT)
Dept: OBSTETRICS AND GYNECOLOGY | Facility: CLINIC | Age: 22
End: 2017-08-16

## 2017-08-16 ENCOUNTER — PROCEDURE VISIT (OUTPATIENT)
Dept: OBSTETRICS AND GYNECOLOGY | Facility: CLINIC | Age: 22
End: 2017-08-16

## 2017-08-16 VITALS — DIASTOLIC BLOOD PRESSURE: 60 MMHG | SYSTOLIC BLOOD PRESSURE: 100 MMHG | WEIGHT: 159 LBS | BODY MASS INDEX: 26.46 KG/M2

## 2017-08-16 DIAGNOSIS — Z36.87 UNSURE OF LMP (LAST MENSTRUAL PERIOD) AS REASON FOR ULTRASOUND SCAN: Primary | ICD-10-CM

## 2017-08-16 DIAGNOSIS — O21.1 HYPEREMESIS GRAVIDARUM BEFORE END OF 22 WEEK GESTATION, DEHYDRATION: Primary | ICD-10-CM

## 2017-08-16 PROCEDURE — 76801 OB US < 14 WKS SINGLE FETUS: CPT | Performed by: OBSTETRICS & GYNECOLOGY

## 2017-08-16 PROCEDURE — 0502F SUBSEQUENT PRENATAL CARE: CPT | Performed by: OBSTETRICS & GYNECOLOGY

## 2017-09-01 ENCOUNTER — ROUTINE PRENATAL (OUTPATIENT)
Dept: OBSTETRICS AND GYNECOLOGY | Facility: CLINIC | Age: 22
End: 2017-09-01

## 2017-09-01 VITALS — BODY MASS INDEX: 27.04 KG/M2 | DIASTOLIC BLOOD PRESSURE: 64 MMHG | WEIGHT: 162.5 LBS | SYSTOLIC BLOOD PRESSURE: 98 MMHG

## 2017-09-01 DIAGNOSIS — O99.611 CONSTIPATION DURING PREGNANCY, FIRST TRIMESTER: ICD-10-CM

## 2017-09-01 DIAGNOSIS — Z34.91 NORMAL PREGNANCY, FIRST TRIMESTER: Primary | ICD-10-CM

## 2017-09-01 DIAGNOSIS — K59.00 CONSTIPATION DURING PREGNANCY, FIRST TRIMESTER: ICD-10-CM

## 2017-09-01 DIAGNOSIS — O21.1 HYPEREMESIS GRAVIDARUM BEFORE END OF 22 WEEK GESTATION, DEHYDRATION: ICD-10-CM

## 2017-09-01 PROBLEM — O99.619 CONSTIPATION DURING PREGNANCY: Status: ACTIVE | Noted: 2017-09-01

## 2017-09-01 PROCEDURE — 99213 OFFICE O/P EST LOW 20 MIN: CPT | Performed by: OBSTETRICS & GYNECOLOGY

## 2017-09-01 NOTE — PROGRESS NOTES
OB follow up     Yasmin Escalante is a 22 y.o.  11w2d being seen today for her obstetrical visit.  Patient reports nausea, no bleeding and no cramping. Fetal movement: absent. Nausea and vomiting much better. + constipation. Did gain weight.  Has not had a bowel movement in 2 weeks.  Does not feel overly symptomatic but 1 suggestions for medical therapy.  Her nausea and vomiting is much better on meds.  She is only vomiting once a day now.    Review of Systems  No bleeding, No cramping/contractions     BP 98/64  Wt 162 lb 8 oz (73.7 kg)  LMP 2017 (Exact Date)  BMI 27.04 kg/m2    FHT: present BPM   Uterine Size: 12 cm       Assessment/Plan:    1) 22 y.o.  -pregnancy at 11w2d    2)   Encounter Diagnoses   Name Primary?   • Normal pregnancy, first trimester Yes   • Hyperemesis gravidarum before end of 22 week gestation, dehydration    • Constipation during pregnancy, first trimester    Patient desires [place] testing today.  We'll draw her labs.  Will start oral MiraLAX daily.    3) Reviewed this stage of pregnancy  4) Problem list updated     Return in about 4 weeks (around 2017) for OB Tummy.      Jeremías Ramirez MD    2017  10:01 AM

## 2017-09-29 ENCOUNTER — ROUTINE PRENATAL (OUTPATIENT)
Dept: OBSTETRICS AND GYNECOLOGY | Facility: CLINIC | Age: 22
End: 2017-09-29

## 2017-09-29 VITALS — BODY MASS INDEX: 26.46 KG/M2 | WEIGHT: 159 LBS | DIASTOLIC BLOOD PRESSURE: 70 MMHG | SYSTOLIC BLOOD PRESSURE: 110 MMHG

## 2017-09-29 DIAGNOSIS — O21.9 NAUSEA AND VOMITING DURING PREGNANCY PRIOR TO 22 WEEKS GESTATION: ICD-10-CM

## 2017-09-29 DIAGNOSIS — Z3A.15 15 WEEKS GESTATION OF PREGNANCY: Primary | ICD-10-CM

## 2017-09-29 PROCEDURE — 0502F SUBSEQUENT PRENATAL CARE: CPT | Performed by: OBSTETRICS & GYNECOLOGY

## 2017-09-29 NOTE — PROGRESS NOTES
OB follow up     Yasmin Escalante is a 22 y.o.  15w2d being seen today for her obstetrical visit.  Patient reports no bleeding, no contractions and no leaking. Fetal movement: normal.    Review of Systems  No bleeding, No cramping/contractions     /70  Wt 159 lb (72.1 kg)  LMP 2017 (Exact Date)  BMI 26.46 kg/m2    FHT: present BPM   Uterine Size: 15 cm       Assessment/Plan:    1) 22 y.o.  -pregnancy at 15w2d    2) Nausea and vomiting-off everything but Diclegis. improving  Encounter Diagnoses   Name Primary?   • 15 weeks gestation of pregnancy Yes   • Nausea and vomiting during pregnancy prior to 22 weeks gestation        3) Reviewed this stage of pregnancy  4) Problem list updated     Return in about 4 weeks (around 10/27/2017) for US, Anatomy, OB Tummy.      Jeremías Ramirez MD    2017  10:32 AM

## 2017-10-06 LAB — DRUGS UR: NORMAL

## 2017-10-26 ENCOUNTER — ROUTINE PRENATAL (OUTPATIENT)
Dept: OBSTETRICS AND GYNECOLOGY | Facility: CLINIC | Age: 22
End: 2017-10-26

## 2017-10-26 ENCOUNTER — PROCEDURE VISIT (OUTPATIENT)
Dept: OBSTETRICS AND GYNECOLOGY | Facility: CLINIC | Age: 22
End: 2017-10-26

## 2017-10-26 VITALS — BODY MASS INDEX: 27.96 KG/M2 | WEIGHT: 168 LBS | DIASTOLIC BLOOD PRESSURE: 68 MMHG | SYSTOLIC BLOOD PRESSURE: 110 MMHG

## 2017-10-26 DIAGNOSIS — Z3A.19 19 WEEKS GESTATION OF PREGNANCY: Primary | ICD-10-CM

## 2017-10-26 DIAGNOSIS — O21.9 NAUSEA AND VOMITING DURING PREGNANCY PRIOR TO 22 WEEKS GESTATION: ICD-10-CM

## 2017-10-26 DIAGNOSIS — Z36.89 ENCOUNTER FOR FETAL ANATOMIC SURVEY: Primary | ICD-10-CM

## 2017-10-26 PROCEDURE — 99213 OFFICE O/P EST LOW 20 MIN: CPT | Performed by: OBSTETRICS & GYNECOLOGY

## 2017-10-26 PROCEDURE — 76805 OB US >/= 14 WKS SNGL FETUS: CPT | Performed by: OBSTETRICS & GYNECOLOGY

## 2017-10-26 RX ORDER — RANITIDINE 150 MG/1
150 CAPSULE ORAL 2 TIMES DAILY
Qty: 60 CAPSULE | Refills: 3 | Status: SHIPPED | OUTPATIENT
Start: 2017-10-26 | End: 2017-11-20

## 2017-10-28 LAB
AFP ADJ MOM SERPL: 0.94
AFP INTERP SERPL-IMP: NORMAL
AFP INTERP SERPL-IMP: NORMAL
AFP SERPL-MCNC: 43.3 NG/ML
AGE AT DELIVERY: 23.8 YEARS
GA METHOD: NORMAL
GA: 19.1 WEEKS
IDDM PATIENT QL: NO
LABORATORY COMMENT REPORT: NORMAL
MULTIPLE PREGNANCY: NO
NEURAL TUBE DEFECT RISK FETUS: NORMAL %
RESULT: NORMAL

## 2017-10-30 ENCOUNTER — TELEPHONE (OUTPATIENT)
Dept: OBSTETRICS AND GYNECOLOGY | Facility: CLINIC | Age: 22
End: 2017-10-30

## 2017-11-20 ENCOUNTER — ROUTINE PRENATAL (OUTPATIENT)
Dept: OBSTETRICS AND GYNECOLOGY | Facility: CLINIC | Age: 22
End: 2017-11-20

## 2017-11-20 VITALS — SYSTOLIC BLOOD PRESSURE: 112 MMHG | BODY MASS INDEX: 29.29 KG/M2 | DIASTOLIC BLOOD PRESSURE: 68 MMHG | WEIGHT: 176 LBS

## 2017-11-20 DIAGNOSIS — K21.9 GASTROESOPHAGEAL REFLUX DISEASE, ESOPHAGITIS PRESENCE NOT SPECIFIED: Primary | ICD-10-CM

## 2017-11-20 PROCEDURE — 99213 OFFICE O/P EST LOW 20 MIN: CPT | Performed by: OBSTETRICS & GYNECOLOGY

## 2017-11-20 RX ORDER — RANITIDINE 150 MG/1
TABLET ORAL
COMMUNITY
Start: 2017-10-26 | End: 2017-11-20

## 2017-11-20 RX ORDER — RANITIDINE 300 MG/1
300 CAPSULE ORAL 2 TIMES DAILY
Qty: 60 CAPSULE | Refills: 3 | Status: SHIPPED | OUTPATIENT
Start: 2017-11-20 | End: 2018-01-17 | Stop reason: SDUPTHER

## 2017-11-20 NOTE — PROGRESS NOTES
OB follow up     Yasmin Escalante is a 22 y.o.  22w5d being seen today for her obstetrical visit.  Patient reports heartburn. Fetal movement: normal.She is on Zantac 150 mg po BID but no relief thus far.     Review of Systems  No bleeding, No cramping/contractions     /68  Wt 176 lb (79.8 kg)  LMP 2017 (Exact Date)  BMI 29.29 kg/m2    FHT:   155 BPM   Uterine Size:  24 cms       Assessment/Plan:    1) 22 y.o.  -pregnancy at 22w5d- normal anatomy scan, [place] and AFP  2) Declines flu shot.  3) GERD- increase Zantac 300 mg po BID  4) Reviewed this stage of pregnancy  5)Problem list updated   6) RTO 4 weeks OBT, 2 hour GTT and Rh+        Lila Perez MD    2017  3:55 PM

## 2017-11-24 ENCOUNTER — HOSPITAL ENCOUNTER (OUTPATIENT)
Facility: HOSPITAL | Age: 22
Discharge: HOME | End: 2017-11-25
Attending: OBSTETRICS & GYNECOLOGY | Admitting: OBSTETRICS & GYNECOLOGY

## 2017-11-24 VITALS
SYSTOLIC BLOOD PRESSURE: 118 MMHG | TEMPERATURE: 97.1 F | WEIGHT: 178 LBS | HEIGHT: 65 IN | HEART RATE: 95 BPM | DIASTOLIC BLOOD PRESSURE: 68 MMHG | BODY MASS INDEX: 29.66 KG/M2 | RESPIRATION RATE: 18 BRPM

## 2017-11-24 LAB
BACTERIA UR QL AUTO: ABNORMAL /HPF
BILIRUB UR QL STRIP: NEGATIVE
CLARITY UR: CLEAR
COLOR UR: YELLOW
GLUCOSE UR STRIP-MCNC: NEGATIVE MG/DL
HGB UR QL STRIP.AUTO: NEGATIVE
KETONES UR QL STRIP: NEGATIVE
LEUKOCYTE ESTERASE UR QL STRIP.AUTO: ABNORMAL
NITRITE UR QL STRIP: NEGATIVE
PH UR STRIP.AUTO: 6 [PH] (ref 4.5–8)
PROT UR QL STRIP: NEGATIVE
RBC # UR: ABNORMAL /HPF
REF LAB TEST METHOD: ABNORMAL
SP GR UR STRIP: 1.02 (ref 1–1.03)
SQUAMOUS #/AREA URNS HPF: ABNORMAL /HPF
UROBILINOGEN UR QL STRIP: ABNORMAL
WBC UR QL AUTO: ABNORMAL /HPF

## 2017-11-24 PROCEDURE — 81001 URINALYSIS AUTO W/SCOPE: CPT | Performed by: OBSTETRICS & GYNECOLOGY

## 2017-11-24 PROCEDURE — 87086 URINE CULTURE/COLONY COUNT: CPT | Performed by: OBSTETRICS & GYNECOLOGY

## 2017-11-24 PROCEDURE — G0463 HOSPITAL OUTPT CLINIC VISIT: HCPCS

## 2017-11-25 PROCEDURE — 59025 FETAL NON-STRESS TEST: CPT

## 2017-11-25 PROCEDURE — 59025 FETAL NON-STRESS TEST: CPT | Performed by: OBSTETRICS & GYNECOLOGY

## 2017-11-25 PROCEDURE — 99214 OFFICE O/P EST MOD 30 MIN: CPT | Performed by: OBSTETRICS & GYNECOLOGY

## 2017-11-25 NOTE — NON STRESS TEST
Yasmin Escalante, a  at 23w3d with an URIAH of 3/21/2018, by Ultrasound, was seen at [facility] for a nonstress test.    Chief Complaint   Patient presents with   • Back Pain       Interpretation A  Nonstress Test Interpretation A: Reactive (17 0028 : Kathleen Brewer RN)      Pt seen in triage for back pain that radiates to the bilateral sides.  Pt also states that she has had pain in the upper abdomen.  UA sent and urine culture pending.

## 2017-11-26 LAB — BACTERIA SPEC AEROBE CULT: NO GROWTH

## 2017-11-27 ENCOUNTER — TELEPHONE (OUTPATIENT)
Dept: OBSTETRICS AND GYNECOLOGY | Facility: CLINIC | Age: 22
End: 2017-11-27

## 2017-12-20 ENCOUNTER — ROUTINE PRENATAL (OUTPATIENT)
Dept: OBSTETRICS AND GYNECOLOGY | Facility: CLINIC | Age: 22
End: 2017-12-20

## 2017-12-20 VITALS — SYSTOLIC BLOOD PRESSURE: 112 MMHG | DIASTOLIC BLOOD PRESSURE: 76 MMHG | WEIGHT: 186.2 LBS | BODY MASS INDEX: 30.99 KG/M2

## 2017-12-20 DIAGNOSIS — O21.0 HYPEREMESIS AFFECTING PREGNANCY, ANTEPARTUM: ICD-10-CM

## 2017-12-20 DIAGNOSIS — Z36.9 ENCOUNTER FOR ANTENATAL SCREENING, UNSPECIFIED: Primary | ICD-10-CM

## 2017-12-20 DIAGNOSIS — K59.00 CONSTIPATION DURING PREGNANCY IN FIRST TRIMESTER: ICD-10-CM

## 2017-12-20 DIAGNOSIS — K21.9 GASTROESOPHAGEAL REFLUX DISEASE, ESOPHAGITIS PRESENCE NOT SPECIFIED: ICD-10-CM

## 2017-12-20 DIAGNOSIS — Z3A.27 27 WEEKS GESTATION OF PREGNANCY: ICD-10-CM

## 2017-12-20 DIAGNOSIS — O99.611 CONSTIPATION DURING PREGNANCY IN FIRST TRIMESTER: ICD-10-CM

## 2017-12-20 PROBLEM — O21.1 HYPEREMESIS GRAVIDARUM BEFORE END OF 22 WEEK GESTATION, DEHYDRATION: Status: RESOLVED | Noted: 2017-08-07 | Resolved: 2017-12-20

## 2017-12-20 PROBLEM — Z34.90 PREGNANCY: Status: ACTIVE | Noted: 2017-12-20

## 2017-12-20 PROBLEM — O21.9 NAUSEA AND VOMITING DURING PREGNANCY PRIOR TO 22 WEEKS GESTATION: Status: RESOLVED | Noted: 2017-08-04 | Resolved: 2017-12-20

## 2017-12-20 PROBLEM — O09.299 HISTORY OF MISCARRIAGE, CURRENTLY PREGNANT: Status: RESOLVED | Noted: 2017-07-31 | Resolved: 2017-12-20

## 2017-12-20 LAB
GLUCOSE 1H P 75 G GLC PO SERPL-MCNC: 103 MG/DL (ref 40–300)
GLUCOSE 2H P 75 G GLC PO SERPL-MCNC: 87 MG/DL (ref 40–300)
GLUCOSE P FAST SERPL-MCNC: 63 MG/DL (ref 65–99)
HCT VFR BLD AUTO: 32.7 % (ref 37–47)
HGB BLD-MCNC: 11 G/DL (ref 12–16)

## 2017-12-20 PROCEDURE — 99213 OFFICE O/P EST LOW 20 MIN: CPT | Performed by: OBSTETRICS & GYNECOLOGY

## 2017-12-20 PROCEDURE — 90471 IMMUNIZATION ADMIN: CPT | Performed by: OBSTETRICS & GYNECOLOGY

## 2017-12-20 PROCEDURE — 90715 TDAP VACCINE 7 YRS/> IM: CPT | Performed by: OBSTETRICS & GYNECOLOGY

## 2017-12-20 RX ORDER — DOXYLAMINE SUCCINATE AND PYRIDOXINE HYDROCHLORIDE, DELAYED RELEASE TABLETS 10 MG/10 MG 10; 10 MG/1; MG/1
TABLET, DELAYED RELEASE ORAL
Qty: 120 TABLET | Refills: 2 | Status: ON HOLD | OUTPATIENT
Start: 2017-12-20 | End: 2018-02-22

## 2017-12-20 RX ORDER — VALACYCLOVIR HYDROCHLORIDE 500 MG/1
500 TABLET, FILM COATED ORAL DAILY
Qty: 6 TABLET | Refills: 6 | Status: SHIPPED | OUTPATIENT
Start: 2017-12-20 | End: 2017-12-23

## 2017-12-20 NOTE — PROGRESS NOTES
CC: OB OFFICE VISIT    Yasmin Escalante is a 22 y.o.  27w0d being seen today for her obstetrical visit.  Patient reports nausea and vomiting . Fetal movement: normal.    HPI: Pt here for ob tummy & GTT.  Requests more diclegis.  This is a recurrent problem.The problem has been unchanged.    Pt denies shortness of breath, chest pain, visual changes, vaginal bleeding, lower extremity swelling, headaches or rashes.    The problem occurs constantly.      Review of Systems  Pt denies visual changes, headaches, shortness of breath, chest pain, esophageal reflux, gastric pain, nausea and vomiting, diarrhea, rashes, vaginal bleeding, edema, hip pain, pelvic pressure.     Past Medical History:   Diagnosis Date   • GERD (gastroesophageal reflux disease) 2017       /76  Wt 84.5 kg (186 lb 3.2 oz)  LMP 2017 (Exact Date)  BMI 30.99 kg/m2    Exam as above    Data Review:    Lab Results (last 24 hours)     ** No results found for the last 24 hours. **            Counseling given: Not Answered    Assessment/Plan:    Patient Active Problem List   Diagnosis   • Constipation during pregnancy   • GERD (gastroesophageal reflux disease)   • Pregnancy   • Hyperemesis affecting pregnancy, antepartum     Yasmin was seen today for routine prenatal visit.    Diagnoses and all orders for this visit:    Encounter for  screening, unspecified  -     Gestational GTT 2 Hr ([facility])  -     Hemoglobin & Hematocrit, Blood    Gastroesophageal reflux disease, esophagitis presence not specified    Constipation during pregnancy in first trimester    27 weeks gestation of pregnancy    Hyperemesis affecting pregnancy, antepartum    Other orders  -     doxylamine-pyridoxine (DICLEGIS) 10-10 MG tablet delayed-release EC tablet; Take 2 tablets at bedtime. Take 1 tablet in the morning. Take 1 tablet in the afternoon.          MEDICAL DECISION MAKING     1. 22 y.o.  -pregnancy at 27w0d   2. Pregnancy Assessment : Active  Problem with Pregnancy nausea and vomiting.  3. Reviewed this stage of pregnancy  4. Problem list updated   5. Postpartum contraception discussed.   yes  6. Pediatrician choice discussed? yes  7. If male, is circumcision desired? female  8. Breastfeeding discussed? yes   9. Anemia affecting pregnancy. no?   10. Continue prenatal vitamins and iron if tolerated.   11. Tests ordered for this or next visit: GTT/H&H  12. New Meds: yes: diclegis, TDAP today, valtrex for HSV-1 outbreaks      Return in about 2 weeks (around 1/3/2018) for ob tummy.      Brian Sarmiento MD  12/20/2017  10:02 AM

## 2018-01-03 ENCOUNTER — ROUTINE PRENATAL (OUTPATIENT)
Dept: OBSTETRICS AND GYNECOLOGY | Facility: CLINIC | Age: 23
End: 2018-01-03

## 2018-01-03 VITALS — BODY MASS INDEX: 32.02 KG/M2 | SYSTOLIC BLOOD PRESSURE: 110 MMHG | WEIGHT: 192.4 LBS | DIASTOLIC BLOOD PRESSURE: 70 MMHG

## 2018-01-03 DIAGNOSIS — Z3A.29 29 WEEKS GESTATION OF PREGNANCY: Primary | ICD-10-CM

## 2018-01-03 DIAGNOSIS — J39.2 PHARYNGEAL PAIN: ICD-10-CM

## 2018-01-03 PROCEDURE — 99213 OFFICE O/P EST LOW 20 MIN: CPT | Performed by: OBSTETRICS & GYNECOLOGY

## 2018-01-03 NOTE — PROGRESS NOTES
CC: OB OFFICE VISIT    Yasmin SORIA is a 22 y.o.  29w0d being seen today for her obstetrical visit.  Patient reports complains of flu-like symptoms . Fetal movement: normal.    HPI: Pt here for ob tummy.  This is a recurrent problem.The problem has been unchanged.    Pt denies shortness of breath, chest pain, visual changes, vaginal bleeding, lower extremity swelling, headaches or rashes.    The problem occurs constantly.      Review of Systems  Pt denies visual changes, headaches, shortness of breath, chest pain, esophageal reflux, gastric pain, nausea and vomiting, diarrhea, rashes, vaginal bleeding, edema, hip pain, pelvic pressure. Pt complains of throat pain, resolving sinus pain, and cough.  Pt had fever which is now resolved.  Has not had flu shot or Tdap.     Past Medical History:   Diagnosis Date   • GERD (gastroesophageal reflux disease) 2017       /70  Wt 87.3 kg (192 lb 6.4 oz)  LMP 2017 (Exact Date)  BMI 32.02 kg/m2    Exam as above    Data Review:    Lab Results (last 24 hours)     ** No results found for the last 24 hours. **            Counseling given: Not Answered        Assessment/Plan:    Patient Active Problem List   Diagnosis   • Constipation during pregnancy   • GERD (gastroesophageal reflux disease)   • Pregnancy   • Hyperemesis affecting pregnancy, antepartum   • Pharyngeal pain     Yasmin was seen today for routine prenatal visit.    Diagnoses and all orders for this visit:    29 weeks gestation of pregnancy    Pharyngeal pain    Other orders  -     lidocaine viscous (XYLOCAINE) 2 % solution; Take 10 mL by mouth As Needed for Mild Pain .          MEDICAL DECISION MAKING     1. 22 y.o.  -pregnancy at 29w0d   2. Pregnancy Assessment : Normal Pregnancy URI symptoms, neg flu screen today.   3. Reviewed this stage of pregnancy  4. Problem list updated   5. Continue prenatal vitamins and iron if tolerated.   6. Tests ordered for this or next visit: LABS: flu  screen  7. New Meds: yes:  Lidocaine swishes.      Return in about 2 weeks (around 1/17/2018) for ob tummy.      Brian Sarmiento MD  1/3/2018  4:34 PM

## 2018-01-05 ENCOUNTER — TELEPHONE (OUTPATIENT)
Dept: OBSTETRICS AND GYNECOLOGY | Facility: CLINIC | Age: 23
End: 2018-01-05

## 2018-01-05 RX ORDER — AZITHROMYCIN 250 MG/1
TABLET, FILM COATED ORAL
Qty: 5 TABLET | Refills: 1 | Status: SHIPPED | OUTPATIENT
Start: 2018-01-05 | End: 2018-01-10

## 2018-01-17 ENCOUNTER — ROUTINE PRENATAL (OUTPATIENT)
Dept: OBSTETRICS AND GYNECOLOGY | Facility: CLINIC | Age: 23
End: 2018-01-17

## 2018-01-17 VITALS — SYSTOLIC BLOOD PRESSURE: 140 MMHG | BODY MASS INDEX: 33.78 KG/M2 | WEIGHT: 203 LBS | DIASTOLIC BLOOD PRESSURE: 92 MMHG

## 2018-01-17 DIAGNOSIS — O21.0 HYPEREMESIS AFFECTING PREGNANCY, ANTEPARTUM: Primary | ICD-10-CM

## 2018-01-17 DIAGNOSIS — O16.3 HYPERTENSION DURING PREGNANCY IN THIRD TRIMESTER, UNSPECIFIED HYPERTENSION IN PREGNANCY TYPE: ICD-10-CM

## 2018-01-17 DIAGNOSIS — K21.9 GASTROESOPHAGEAL REFLUX DISEASE WITHOUT ESOPHAGITIS: ICD-10-CM

## 2018-01-17 LAB
ALBUMIN SERPL-MCNC: 3.5 G/DL (ref 3.5–5.2)
ALBUMIN/GLOB SERPL: 1.5 G/DL
ALP SERPL-CCNC: 70 U/L (ref 40–129)
ALT SERPL-CCNC: 23 U/L (ref 5–33)
AST SERPL-CCNC: 18 U/L (ref 5–32)
BASOPHILS # BLD AUTO: 0.02 10*3/MM3 (ref 0–0.2)
BASOPHILS NFR BLD AUTO: 0.2 % (ref 0–2)
BILIRUB SERPL-MCNC: <0.2 MG/DL (ref 0.2–1.2)
BUN SERPL-MCNC: 6 MG/DL (ref 6–20)
BUN/CREAT SERPL: 14.6 (ref 7–25)
CALCIUM SERPL-MCNC: 8.5 MG/DL (ref 8.6–10.5)
CHLORIDE SERPL-SCNC: 101 MMOL/L (ref 98–107)
CO2 SERPL-SCNC: 24.3 MMOL/L (ref 22–29)
CREAT SERPL-MCNC: 0.41 MG/DL (ref 0.57–1)
EOSINOPHIL # BLD AUTO: 0.06 10*3/MM3 (ref 0.1–0.3)
EOSINOPHIL NFR BLD AUTO: 0.5 % (ref 0–4)
ERYTHROCYTE [DISTWIDTH] IN BLOOD BY AUTOMATED COUNT: 13.3 % (ref 11.5–14.5)
EXTERNAL HEMATOCRIT: 31 %
EXTERNAL HEMOGLOBIN: 10.5 G/DL
GLOBULIN SER CALC-MCNC: 2.4 GM/DL
GLUCOSE SERPL-MCNC: 112 MG/DL (ref 65–99)
HCT VFR BLD AUTO: 30.9 % (ref 37–47)
HGB BLD-MCNC: 10.5 G/DL (ref 12–16)
IMM GRANULOCYTES # BLD: 0.15 10*3/MM3 (ref 0–0.03)
IMM GRANULOCYTES NFR BLD: 1.2 % (ref 0–0.5)
LYMPHOCYTES # BLD AUTO: 2.24 10*3/MM3 (ref 0.6–4.8)
LYMPHOCYTES NFR BLD AUTO: 18.3 % (ref 20–45)
MCH RBC QN AUTO: 31 PG (ref 27–31)
MCHC RBC AUTO-ENTMCNC: 34 G/DL (ref 31–37)
MCV RBC AUTO: 91.2 FL (ref 81–99)
MONOCYTES # BLD AUTO: 0.88 10*3/MM3 (ref 0–1)
MONOCYTES NFR BLD AUTO: 7.2 % (ref 3–8)
NEUTROPHILS # BLD AUTO: 8.86 10*3/MM3 (ref 1.5–8.3)
NEUTROPHILS NFR BLD AUTO: 72.6 % (ref 45–70)
NRBC BLD AUTO-RTO: 0 /100 WBC (ref 0–0)
PLATELET # BLD AUTO: 254 10*3/MM3 (ref 140–500)
POTASSIUM SERPL-SCNC: 3.5 MMOL/L (ref 3.5–5.2)
PROT SERPL-MCNC: 5.9 G/DL (ref 6–8.5)
RBC # BLD AUTO: 3.39 10*6/MM3 (ref 4.2–5.4)
SODIUM SERPL-SCNC: 137 MMOL/L (ref 136–145)
WBC # BLD AUTO: 12.21 10*3/MM3 (ref 4.8–10.8)

## 2018-01-17 PROCEDURE — 99214 OFFICE O/P EST MOD 30 MIN: CPT | Performed by: OBSTETRICS & GYNECOLOGY

## 2018-01-17 RX ORDER — RANITIDINE 300 MG/1
300 CAPSULE ORAL 2 TIMES DAILY
Qty: 60 CAPSULE | Refills: 3 | Status: ON HOLD | OUTPATIENT
Start: 2018-01-17 | End: 2018-02-22

## 2018-01-17 NOTE — PROGRESS NOTES
OB follow up     Yasmin SORIA is a 22 y.o.  31w0d being seen today for her obstetrical visit.  Patient reports heartburn, nausea, no bleeding, no contractions and no leaking. Fetal movement: normal.  Patient feels fatigued.  She feels worn down.  She is concerned with her blood pressure.  She denies any headache or visual changes.  She needs refills on her Zantac and her other anti-medics because she still has nausea vomiting present.  These are 3 chronic conditions.    Review of Systems  No bleeding, No cramping/contractions     /92  Wt 92.1 kg (203 lb)  LMP 2017 (Exact Date)  BMI 33.78 kg/m2    FHT: present BPM   Uterine Size: 31 cm   Awake alert and oriented ×3  Abdomen soft, gravid, fundal height 31 cm, fetal heart tones present.  No rebound or guarding.  Extremities are warm dry hands have 1+ edema.    Assessment/Plan:    1) 22 y.o.  -pregnancy at 31w0d    2)   Encounter Diagnoses   Name Primary?   • Hyperemesis affecting pregnancy, antepartum Yes   • Hypertension during pregnancy in third trimester, unspecified hypertension in pregnancy type    • Gastroesophageal reflux disease without esophagitis    This patient has new onset hypertension and proteinuria in pregnancy.  This is possibly consistent with preeclampsia at  gestation.  Check CBC and CMP today.  We'll start 24 hour urine at home tonight and she will bring it back tomorrow.  I will refill her Zantac and she has enough of her anti-medics at home.  Her hyperemesis is well-controlled on her Zantac and anti-medics.    3) Reviewed this stage of pregnancy  4) Problem list updated     Return in about 7 days (around 2018) for OB [place].      Jeremías Ramirez MD    2018  4:22 PM

## 2018-01-19 ENCOUNTER — LAB (OUTPATIENT)
Dept: OBSTETRICS AND GYNECOLOGY | Facility: CLINIC | Age: 23
End: 2018-01-19

## 2018-01-19 DIAGNOSIS — O16.9 HTN COMPLICATING PERIPREGNANCY, ANTEPARTUM: Primary | ICD-10-CM

## 2018-01-19 LAB
PROT 24H UR-MRATE: 252 MG/24HOURS (ref 28–141)
PROT UR-MCNC: 12 MG/DL

## 2018-01-22 ENCOUNTER — TELEPHONE (OUTPATIENT)
Dept: OBSTETRICS AND GYNECOLOGY | Facility: CLINIC | Age: 23
End: 2018-01-22

## 2018-01-22 RX ORDER — FLUCONAZOLE 150 MG/1
150 TABLET ORAL DAILY
Qty: 1 TABLET | Refills: 1 | Status: ON HOLD | OUTPATIENT
Start: 2018-01-22 | End: 2018-02-22

## 2018-01-23 ENCOUNTER — ROUTINE PRENATAL (OUTPATIENT)
Dept: OBSTETRICS AND GYNECOLOGY | Facility: CLINIC | Age: 23
End: 2018-01-23

## 2018-01-23 VITALS — DIASTOLIC BLOOD PRESSURE: 84 MMHG | SYSTOLIC BLOOD PRESSURE: 146 MMHG | WEIGHT: 200 LBS | BODY MASS INDEX: 33.28 KG/M2

## 2018-01-23 DIAGNOSIS — Z3A.31 31 WEEKS GESTATION OF PREGNANCY: ICD-10-CM

## 2018-01-23 DIAGNOSIS — K59.00 CONSTIPATION DURING PREGNANCY IN THIRD TRIMESTER: ICD-10-CM

## 2018-01-23 DIAGNOSIS — O13.3 GESTATIONAL HYPERTENSION WITHOUT SIGNIFICANT PROTEINURIA IN THIRD TRIMESTER: Primary | ICD-10-CM

## 2018-01-23 DIAGNOSIS — O99.613 CONSTIPATION DURING PREGNANCY IN THIRD TRIMESTER: ICD-10-CM

## 2018-01-23 DIAGNOSIS — K21.9 GASTROESOPHAGEAL REFLUX DISEASE WITHOUT ESOPHAGITIS: ICD-10-CM

## 2018-01-23 PROBLEM — O21.0 HYPEREMESIS AFFECTING PREGNANCY, ANTEPARTUM: Status: RESOLVED | Noted: 2017-12-20 | Resolved: 2018-01-23

## 2018-01-23 PROCEDURE — 99213 OFFICE O/P EST LOW 20 MIN: CPT | Performed by: OBSTETRICS & GYNECOLOGY

## 2018-01-23 RX ORDER — DOCUSATE SODIUM 100 MG/1
100 CAPSULE, LIQUID FILLED ORAL 2 TIMES DAILY PRN
Qty: 60 CAPSULE | Refills: 1 | Status: SHIPPED | OUTPATIENT
Start: 2018-01-23 | End: 2018-03-02

## 2018-01-23 NOTE — PROGRESS NOTES
CC: OB OFFICE VISIT    Yasmin SORIA is a 22 y.o.  31w6d being seen today for her obstetrical visit.  Patient reports swelling, facial swelling, pressure . Fetal movement: normal.    HPI: Pt here for ob tummy.  This is a recurrent problem.The problem has been unchanged.    Pt denies shortness of breath, chest pain, visual changes, vaginal bleeding, lower extremity swelling, headaches or rashes.    The problem occurs constantly.      Review of Systems  Pt denies visual changes, headaches, shortness of breath, chest pain, esophageal reflux, gastric pain, nausea and vomiting, diarrhea, rashes, vaginal bleeding, edema, hip pain, pelvic pressure.     Past Medical History:   Diagnosis Date   • GERD (gastroesophageal reflux disease) 2017       /84  Wt 90.7 kg (200 lb)  LMP 2017 (Exact Date)  BMI 33.28 kg/m2    Exam as above    Data Review:    Lab Results (last 24 hours)     ** No results found for the last 24 hours. **            Counseling given: Not Answered          Assessment/Plan:    Patient Active Problem List   Diagnosis   • Constipation during pregnancy   • GERD (gastroesophageal reflux disease)   • Pregnancy   • Pharyngeal pain   • Hypertension during pregnancy in third trimester     Yasmin was seen today for routine prenatal visit.    Diagnoses and all orders for this visit:    Gestational hypertension without significant proteinuria in third trimester    Constipation during pregnancy in third trimester    Gastroesophageal reflux disease without esophagitis    31 weeks gestation of pregnancy    Other orders  -     docusate sodium (COLACE) 100 MG capsule; Take 1 capsule by mouth 2 (Two) Times a Day As Needed for Constipation.        MEDICAL DECISION MAKING     1. 22 y.o.  -pregnancy at 31w6d   2. Pregnancy Assessment : High Risk Pregnancy GHTN, BMI  3. Reviewed this stage of pregnancy  4. Problem list updated    5. Continue prenatal vitamins and iron if tolerated.   6. Tests  ordered for this or next visit:  TESTING FOR BMI, GHTN  7. New Meds: yes: colace.  8. Start weekly ANT at next visit.   9. Glycosuria noted.  Pt states she drinks a gallon of milk/day.  Advised to decrease.       Return in about 2 weeks (around 2018).      Brian Sarmiento MD  2018  2:36 PM

## 2018-02-06 ENCOUNTER — PROCEDURE VISIT (OUTPATIENT)
Dept: OBSTETRICS AND GYNECOLOGY | Facility: CLINIC | Age: 23
End: 2018-02-06

## 2018-02-06 ENCOUNTER — ROUTINE PRENATAL (OUTPATIENT)
Dept: OBSTETRICS AND GYNECOLOGY | Facility: CLINIC | Age: 23
End: 2018-02-06

## 2018-02-06 VITALS — DIASTOLIC BLOOD PRESSURE: 74 MMHG | WEIGHT: 205 LBS | BODY MASS INDEX: 34.11 KG/M2 | SYSTOLIC BLOOD PRESSURE: 132 MMHG

## 2018-02-06 DIAGNOSIS — O13.3 GESTATIONAL HYPERTENSION WITHOUT SIGNIFICANT PROTEINURIA IN THIRD TRIMESTER: ICD-10-CM

## 2018-02-06 DIAGNOSIS — Z3A.33 33 WEEKS GESTATION OF PREGNANCY: ICD-10-CM

## 2018-02-06 DIAGNOSIS — O16.3 HYPERTENSION AFFECTING PREGNANCY IN THIRD TRIMESTER: Primary | ICD-10-CM

## 2018-02-06 DIAGNOSIS — K21.9 GASTROESOPHAGEAL REFLUX DISEASE WITHOUT ESOPHAGITIS: Primary | ICD-10-CM

## 2018-02-06 PROBLEM — O21.0 HYPEREMESIS AFFECTING PREGNANCY, ANTEPARTUM: Status: ACTIVE | Noted: 2018-02-06

## 2018-02-06 LAB — HBA1C MFR BLD: 4.7 % (ref 4.8–5.6)

## 2018-02-06 PROCEDURE — 99213 OFFICE O/P EST LOW 20 MIN: CPT | Performed by: OBSTETRICS & GYNECOLOGY

## 2018-02-06 PROCEDURE — 76816 OB US FOLLOW-UP PER FETUS: CPT | Performed by: OBSTETRICS & GYNECOLOGY

## 2018-02-06 NOTE — PROGRESS NOTES
CC: OB OFFICE VISIT    Yasmin SORIA is a 22 y.o.  33w6d being seen today for her obstetrical visit.  Patient reports no complaints . Fetal movement: normal.    HPI: Pt here for ANT for BMI,.  This is a recurrent problem.The problem has been unchanged.    Pt denies shortness of breath, chest pain, visual changes, vaginal bleeding, lower extremity swelling, headaches or rashes.    The problem occurs constantly.      Review of Systems  Pt denies visual changes, headaches, shortness of breath, chest pain, esophageal reflux, gastric pain, nausea and vomiting, diarrhea, rashes, vaginal bleeding, edema, hip pain, pelvic pressure.     Past Medical History:   Diagnosis Date   • GERD (gastroesophageal reflux disease) 2017       /74  Wt 93 kg (205 lb)  LMP 2017 (Exact Date)  BMI 34.11 kg/m2    Exam as above    Data Review:    Lab Results (last 24 hours)     ** No results found for the last 24 hours. **            Counseling given: Not Answered        Assessment/Plan:    Patient Active Problem List   Diagnosis   • Constipation during pregnancy   • GERD (gastroesophageal reflux disease)   • Pregnancy   • Pharyngeal pain   • Hypertension during pregnancy in third trimester   • Hyperemesis affecting pregnancy, antepartum     Yasmin was seen today for routine prenatal visit.    Diagnoses and all orders for this visit:    Gastroesophageal reflux disease without esophagitis  -     Hemoglobin A1c  -     Protein, Urine, 24 Hour - Urine, Clean Catch; Future    Gestational hypertension without significant proteinuria in third trimester  -     Hemoglobin A1c  -     Protein, Urine, 24 Hour - Urine, Clean Catch; Future    33 weeks gestation of pregnancy  -     Hemoglobin A1c  -     Protein, Urine, 24 Hour - Urine, Clean Catch; Future          MEDICAL DECISION MAKING     1. 22 y.o.  -pregnancy at 33w6d   2. Pregnancy Assessment : High Risk Pregnancy GHTN, BMI, GLUCOSURIA  3. Reviewed this stage of  pregnancy  4. Problem list updated   5. Bpp/nst = 10/10. U/S WNL.  REVIEWED WITH PT.   6. Continue prenatal vitamins and iron if tolerated.   7. Tests ordered for this or next visit:  TESTING FOR BMI, GHTN. HGB a1C, rpt 24 hour urine  8. New Meds: no      No Follow-up on file.      Brian Sarmiento MD  2018  11:45 AM

## 2018-02-08 ENCOUNTER — LAB (OUTPATIENT)
Dept: OBSTETRICS AND GYNECOLOGY | Facility: CLINIC | Age: 23
End: 2018-02-08

## 2018-02-08 DIAGNOSIS — O16.9 HYPERTENSION DURING PREGNANCY, ANTEPARTUM, UNSPECIFIED HYPERTENSION IN PREGNANCY TYPE: Primary | ICD-10-CM

## 2018-02-08 PROBLEM — O14.03 MILD PREECLAMPSIA, THIRD TRIMESTER: Status: ACTIVE | Noted: 2018-02-08

## 2018-02-08 PROBLEM — O16.3 HYPERTENSION DURING PREGNANCY IN THIRD TRIMESTER: Status: RESOLVED | Noted: 2018-01-17 | Resolved: 2018-02-08

## 2018-02-08 LAB
PROT 24H UR-MRATE: 306 MG/24HOURS (ref 28–141)
PROT UR-MCNC: 18 MG/DL

## 2018-02-14 ENCOUNTER — PROCEDURE VISIT (OUTPATIENT)
Dept: OBSTETRICS AND GYNECOLOGY | Facility: CLINIC | Age: 23
End: 2018-02-14

## 2018-02-14 ENCOUNTER — ROUTINE PRENATAL (OUTPATIENT)
Dept: OBSTETRICS AND GYNECOLOGY | Facility: CLINIC | Age: 23
End: 2018-02-14

## 2018-02-14 VITALS — BODY MASS INDEX: 34.45 KG/M2 | WEIGHT: 207 LBS | SYSTOLIC BLOOD PRESSURE: 120 MMHG | DIASTOLIC BLOOD PRESSURE: 76 MMHG

## 2018-02-14 DIAGNOSIS — O14.03 MILD PREECLAMPSIA, THIRD TRIMESTER: Primary | ICD-10-CM

## 2018-02-14 DIAGNOSIS — Z3A.36 36 WEEKS GESTATION OF PREGNANCY: ICD-10-CM

## 2018-02-14 DIAGNOSIS — K21.9 GASTROESOPHAGEAL REFLUX DISEASE WITHOUT ESOPHAGITIS: ICD-10-CM

## 2018-02-14 DIAGNOSIS — O14.03 MILD PRE-ECLAMPSIA IN THIRD TRIMESTER: Primary | ICD-10-CM

## 2018-02-14 LAB — EXTERNAL GROUP B STREP ANTIGEN: POSITIVE

## 2018-02-14 PROCEDURE — 76816 OB US FOLLOW-UP PER FETUS: CPT | Performed by: OBSTETRICS & GYNECOLOGY

## 2018-02-14 PROCEDURE — 99213 OFFICE O/P EST LOW 20 MIN: CPT | Performed by: OBSTETRICS & GYNECOLOGY

## 2018-02-14 PROCEDURE — 76819 FETAL BIOPHYS PROFIL W/O NST: CPT | Performed by: OBSTETRICS & GYNECOLOGY

## 2018-02-14 NOTE — PROGRESS NOTES
CC: OB OFFICE VISIT    Yasmin SORIA is a 22 y.o.  36w0d being seen today for her obstetrical visit.  Patient reports fatigue and no leaking . Fetal movement: normal.    HPI: Pt here for ANT for preeclampsia.  This is a recurrent problem.The problem has been unchanged.    Pt denies shortness of breath, chest pain, visual changes, vaginal bleeding, lower extremity swelling, headaches or rashes.    The problem occurs constantly.      Review of Systems  Pt denies visual changes, headaches, shortness of breath, chest pain, esophageal reflux, gastric pain, nausea and vomiting, diarrhea, rashes, vaginal bleeding, edema, hip pain, pelvic pressure.     Past Medical History:   Diagnosis Date   • GERD (gastroesophageal reflux disease) 2017       /76  Wt 93.9 kg (207 lb)  LMP 2017 (Exact Date)  BMI 34.45 kg/m2    Exam as above    Data Review:    Lab Results (last 24 hours)     ** No results found for the last 24 hours. **            Counseling given: Not Answered      Assessment/Plan:    Patient Active Problem List   Diagnosis   • Constipation during pregnancy   • GERD (gastroesophageal reflux disease)   • Pregnancy   • Pharyngeal pain   • Hyperemesis affecting pregnancy, antepartum   • Mild preeclampsia, third trimester     Yasmin was seen today for routine prenatal visit and non-stress test.    Diagnoses and all orders for this visit:    Mild preeclampsia, third trimester    Gastroesophageal reflux disease without esophagitis    36 weeks gestation of pregnancy  -     Strep Gp B Culture+Rfx ([facility]) - Swab, Vaginal/Rectum          MEDICAL DECISION MAKING     1. 22 y.o.  -pregnancy at 36w0d   2. Pregnancy Assessment : High Risk Pregnancy mild preeclampsia  3. Reviewed this stage of pregnancy  4. Problem list updated   5. Continue prenatal vitamins and iron if tolerated.   6. Tests ordered for this or next visit: NONE  IOL for mild preeclampsia  7. New Meds: no      Return if symptoms worsen or  fail to improve.      Brian Sarmiento MD  2/14/2018  4:40 PM

## 2018-02-19 ENCOUNTER — HOSPITAL ENCOUNTER (OUTPATIENT)
Facility: HOSPITAL | Age: 23
Discharge: HOME | End: 2018-02-19
Attending: OBSTETRICS & GYNECOLOGY | Admitting: OBSTETRICS & GYNECOLOGY

## 2018-02-19 VITALS
HEART RATE: 114 BPM | TEMPERATURE: 98.3 F | WEIGHT: 207 LBS | DIASTOLIC BLOOD PRESSURE: 77 MMHG | BODY MASS INDEX: 34.49 KG/M2 | HEIGHT: 65 IN | RESPIRATION RATE: 18 BRPM | SYSTOLIC BLOOD PRESSURE: 124 MMHG | OXYGEN SATURATION: 98 %

## 2018-02-19 PROBLEM — O47.9 UTERINE CONTRACTIONS DURING PREGNANCY: Status: ACTIVE | Noted: 2018-02-19

## 2018-02-19 LAB
AMPHET+METHAMPHET UR QL: NEGATIVE
AMPHETAMINES UR QL: NEGATIVE
BARBITURATES UR QL SCN: NEGATIVE
BENZODIAZ UR QL SCN: NEGATIVE
BILIRUB BLD-MCNC: NEGATIVE MG/DL
BUPRENORPHINE SERPL-MCNC: NEGATIVE NG/ML
CANNABINOIDS SERPL QL: NEGATIVE
CLARITY, POC: ABNORMAL
COCAINE UR QL: NEGATIVE
COLOR UR: YELLOW
GLUCOSE UR STRIP-MCNC: NEGATIVE MG/DL
KETONES UR QL: NEGATIVE
LEUKOCYTE EST, POC: NEGATIVE
METHADONE UR QL SCN: NEGATIVE
NITRITE UR-MCNC: NEGATIVE MG/ML
OPIATES UR QL: NEGATIVE
OXYCODONE UR QL SCN: NEGATIVE
PCP UR QL SCN: NEGATIVE
PH UR: 7 [PH] (ref 5–8)
PROPOXYPH UR QL: NEGATIVE
PROT UR STRIP-MCNC: NEGATIVE MG/DL
RBC # UR STRIP: NEGATIVE /UL
SP GR UR: 1.02 (ref 1–1.03)
TRICYCLICS UR QL SCN: NEGATIVE
UROBILINOGEN UR QL: NORMAL

## 2018-02-19 PROCEDURE — G0463 HOSPITAL OUTPT CLINIC VISIT: HCPCS

## 2018-02-19 PROCEDURE — 80306 DRUG TEST PRSMV INSTRMNT: CPT | Performed by: OBSTETRICS & GYNECOLOGY

## 2018-02-19 PROCEDURE — 81002 URINALYSIS NONAUTO W/O SCOPE: CPT | Performed by: OBSTETRICS & GYNECOLOGY

## 2018-02-19 PROCEDURE — 59025 FETAL NON-STRESS TEST: CPT | Performed by: OBSTETRICS & GYNECOLOGY

## 2018-02-19 PROCEDURE — 59025 FETAL NON-STRESS TEST: CPT

## 2018-02-19 NOTE — NURSING NOTE
RN called DR. Ramirez to update MD on patient's arrival & status; reached DR. Ramirez's nurse who stated he was in surgery & would call back as soon as he was finished

## 2018-02-19 NOTE — NURSING NOTE
"Patient arrived on L&D floor; states she called the DR's office this morning & told them her symptoms & she states that they told her to come to the l&d floor. Patient complains of contractions starting this morning (1/19) @ 0100. Patient states they have been about every 15 minutes & lasting about 60 seconds. Patient states also that she believes she has been leaking fluid since Friday. Pt states she does not know if fluid has an odor because she has a sinus infection. Patient also states that she has pain in her rectum & low back. Patient rates pain \"7\" in low back & states it is continuous. Patient has not had anything to eat or drink since last night (1/18) @ 2100. Patient is suppose to arrive Wednesday (2/21) for an induction due to preeclampsia. Current blood pressure is 124/77 & urine dipstick does not show any protein. Trace of swelling in BLE.   "

## 2018-02-19 NOTE — NURSING NOTE
DR. Ramirez returned call. RN updated  On baby's heart rate, variability, & accelerations. Updated  On patients complaints of contractions, leaking fluid, low back pain, & pressure in her rectum. Updated  On cervical exam, & nitrizine strip.  Also updated on patient's contractions the past hour. DR. Ramirez stated to send patient home & to come back on Wednesday for induction.

## 2018-02-19 NOTE — NURSING NOTE
Patient discharged; accompanied by mother; discharge instructions reviewed with patient; pt denies further questions; pt signed discharge papers; accompanied to ER and discharged to private car

## 2018-02-19 NOTE — NON STRESS TEST
"  Yasmin SORIA, mena  at 36w5d with an URIAH of 3/14/2018, by Last Menstrual Period, was seen at [facility] for a nonstress test.    Chief Complaint   Patient presents with   • Contractions   • leaking fluid       Interpretation A  Nonstress Test Interpretation A: Reactive (18 1329 : Suzette Pope RN)                     Patient arrived on L&D floor; states she called the DR's office this morning & told them her symptoms & she states that they told her to come to the l&d floor. Patient complains of contractions starting this morning () @ 0100. Patient states they have been about every 15 minutes & lasting about 60 seconds. Patient states also that she believes she has been leaking fluid since Friday. Pt states she does not know if fluid has an odor because she has a sinus infection. Patient also states that she has pain in her rectum & low back. Patient rates pain \"7\" in low back & states it is continuous. Patient has not had anything to eat or drink since last night () @ 2100. Patient is suppose to arrive Wednesday () for an induction due to preeclampsia. Current blood pressure is 124/77 & urine dipstick does not show any protein. Trace of swelling in BLE. Reactive feal heart rate strip. Multiple accelerations noted; some variables noted, 3 contractions noted on strip in an hour and a half. irregula contractions. Mild by palpation;pt denies feeling any contractions; moderate variability noted;                                  "

## 2018-02-20 LAB
B-HEM STREP SPEC QL CULT: POSITIVE
CLINDAMYCIN ISLT KB: NORMAL
ORGANISM ID: NORMAL

## 2018-02-21 ENCOUNTER — HOSPITAL ENCOUNTER (INPATIENT)
Facility: HOSPITAL | Age: 23
LOS: 1 days | Discharge: HOME | End: 2018-02-23
Attending: OBSTETRICS & GYNECOLOGY | Admitting: OBSTETRICS & GYNECOLOGY

## 2018-02-21 DIAGNOSIS — O14.03 MILD PREECLAMPSIA, THIRD TRIMESTER: Primary | ICD-10-CM

## 2018-02-21 PROBLEM — O99.619 CONSTIPATION DURING PREGNANCY: Status: RESOLVED | Noted: 2017-09-01 | Resolved: 2018-02-21

## 2018-02-21 PROBLEM — O47.9 UTERINE CONTRACTIONS DURING PREGNANCY: Status: RESOLVED | Noted: 2018-02-19 | Resolved: 2018-02-21

## 2018-02-21 PROBLEM — Z34.90 PREGNANCY: Status: RESOLVED | Noted: 2017-12-20 | Resolved: 2018-02-21

## 2018-02-21 PROBLEM — K59.00 CONSTIPATION DURING PREGNANCY: Status: RESOLVED | Noted: 2017-09-01 | Resolved: 2018-02-21

## 2018-02-21 PROBLEM — J39.2 PHARYNGEAL PAIN: Status: RESOLVED | Noted: 2018-01-03 | Resolved: 2018-02-21

## 2018-02-21 LAB
ABO GROUP BLD: NORMAL
ABO GROUP BLD: NORMAL
ALBUMIN SERPL-MCNC: 3.2 G/DL (ref 3.5–5.2)
ALBUMIN/GLOB SERPL: 1 G/DL
ALP SERPL-CCNC: 108 U/L (ref 40–129)
ALT SERPL W P-5'-P-CCNC: 11 U/L (ref 5–33)
AMPHET+METHAMPHET UR QL: NEGATIVE
AMPHETAMINES UR QL: NEGATIVE
ANION GAP SERPL CALCULATED.3IONS-SCNC: 12.7 MMOL/L
AST SERPL-CCNC: 17 U/L (ref 5–32)
BARBITURATES UR QL SCN: NEGATIVE
BENZODIAZ UR QL SCN: NEGATIVE
BILIRUB SERPL-MCNC: <0.2 MG/DL (ref 0.2–1.2)
BLD GP AB SCN SERPL QL: NEGATIVE
BUN BLD-MCNC: 5 MG/DL (ref 6–20)
BUN/CREAT SERPL: 11.9 (ref 7–25)
BUPRENORPHINE SERPL-MCNC: NEGATIVE NG/ML
CALCIUM SPEC-SCNC: 8.5 MG/DL (ref 8.6–10.5)
CANNABINOIDS SERPL QL: NEGATIVE
CHLORIDE SERPL-SCNC: 104 MMOL/L (ref 98–107)
CO2 SERPL-SCNC: 21.3 MMOL/L (ref 22–29)
COCAINE UR QL: NEGATIVE
CREAT BLD-MCNC: 0.42 MG/DL (ref 0.57–1)
DEPRECATED RDW RBC AUTO: 42.9 FL (ref 37–54)
ERYTHROCYTE [DISTWIDTH] IN BLOOD BY AUTOMATED COUNT: 13.7 % (ref 11.5–14.5)
FLUAV AG NPH QL: NEGATIVE
FLUBV AG NPH QL IA: NEGATIVE
GFR SERPL CREATININE-BSD FRML MDRD: >150 ML/MIN/1.73
GLOBULIN UR ELPH-MCNC: 3.1 GM/DL
GLUCOSE BLD-MCNC: 84 MG/DL (ref 65–99)
HCT VFR BLD AUTO: 27.8 % (ref 37–47)
HGB BLD-MCNC: 9.4 G/DL (ref 12–16)
MCH RBC QN AUTO: 29.6 PG (ref 27–31)
MCHC RBC AUTO-ENTMCNC: 33.8 G/DL (ref 31–37)
MCV RBC AUTO: 87.4 FL (ref 81–99)
METHADONE UR QL SCN: NEGATIVE
OPIATES UR QL: NEGATIVE
OXYCODONE UR QL SCN: NEGATIVE
PCP UR QL SCN: NEGATIVE
PLATELET # BLD AUTO: 225 10*3/MM3 (ref 140–500)
PMV BLD AUTO: 9.8 FL (ref 7.4–10.4)
POTASSIUM BLD-SCNC: 3.5 MMOL/L (ref 3.5–5.2)
PROPOXYPH UR QL: NEGATIVE
PROT SERPL-MCNC: 6.3 G/DL (ref 6–8.5)
RBC # BLD AUTO: 3.18 10*6/MM3 (ref 4.2–5.4)
RH BLD: POSITIVE
RH BLD: POSITIVE
SODIUM BLD-SCNC: 138 MMOL/L (ref 136–145)
TRICYCLICS UR QL SCN: NEGATIVE
URATE SERPL-MCNC: 2.5 MG/DL (ref 3.4–7)
WBC NRBC COR # BLD: 12.26 10*3/MM3 (ref 4.8–10.8)

## 2018-02-21 PROCEDURE — 80306 DRUG TEST PRSMV INSTRMNT: CPT | Performed by: OBSTETRICS & GYNECOLOGY

## 2018-02-21 PROCEDURE — 80053 COMPREHEN METABOLIC PANEL: CPT | Performed by: OBSTETRICS & GYNECOLOGY

## 2018-02-21 PROCEDURE — 86850 RBC ANTIBODY SCREEN: CPT | Performed by: OBSTETRICS & GYNECOLOGY

## 2018-02-21 PROCEDURE — 86901 BLOOD TYPING SEROLOGIC RH(D): CPT

## 2018-02-21 PROCEDURE — 84550 ASSAY OF BLOOD/URIC ACID: CPT | Performed by: OBSTETRICS & GYNECOLOGY

## 2018-02-21 PROCEDURE — 36415 COLL VENOUS BLD VENIPUNCTURE: CPT | Performed by: OBSTETRICS & GYNECOLOGY

## 2018-02-21 PROCEDURE — 85027 COMPLETE CBC AUTOMATED: CPT | Performed by: OBSTETRICS & GYNECOLOGY

## 2018-02-21 PROCEDURE — S0260 H&P FOR SURGERY: HCPCS | Performed by: OBSTETRICS & GYNECOLOGY

## 2018-02-21 PROCEDURE — 87804 INFLUENZA ASSAY W/OPTIC: CPT | Performed by: OBSTETRICS & GYNECOLOGY

## 2018-02-21 PROCEDURE — 86900 BLOOD TYPING SEROLOGIC ABO: CPT | Performed by: OBSTETRICS & GYNECOLOGY

## 2018-02-21 PROCEDURE — 86900 BLOOD TYPING SEROLOGIC ABO: CPT

## 2018-02-21 PROCEDURE — 86901 BLOOD TYPING SEROLOGIC RH(D): CPT | Performed by: OBSTETRICS & GYNECOLOGY

## 2018-02-21 PROCEDURE — 3E0P7VZ INTRODUCTION OF HORMONE INTO FEMALE REPRODUCTIVE, VIA NATURAL OR ARTIFICIAL OPENING: ICD-10-PCS | Performed by: OBSTETRICS & GYNECOLOGY

## 2018-02-21 PROCEDURE — 81001 URINALYSIS AUTO W/SCOPE: CPT | Performed by: OBSTETRICS & GYNECOLOGY

## 2018-02-21 RX ORDER — OXYTOCIN/RINGER'S LACTATE 20/1000 ML
2 PLASTIC BAG, INJECTION (ML) INTRAVENOUS CONTINUOUS
Status: CANCELLED | OUTPATIENT
Start: 2018-02-21

## 2018-02-21 RX ORDER — FAMOTIDINE 10 MG/ML
20 INJECTION, SOLUTION INTRAVENOUS 2 TIMES DAILY PRN
Status: DISCONTINUED | OUTPATIENT
Start: 2018-02-21 | End: 2018-02-23 | Stop reason: CLARIF

## 2018-02-21 RX ORDER — ONDANSETRON 2 MG/ML
4 INJECTION INTRAMUSCULAR; INTRAVENOUS EVERY 4 HOURS PRN
Status: DISCONTINUED | OUTPATIENT
Start: 2018-02-21 | End: 2018-02-23 | Stop reason: HOSPADM

## 2018-02-21 RX ORDER — LIDOCAINE HYDROCHLORIDE 10 MG/ML
5 INJECTION, SOLUTION EPIDURAL; INFILTRATION; INTRACAUDAL; PERINEURAL AS NEEDED
Status: DISCONTINUED | OUTPATIENT
Start: 2018-02-21 | End: 2018-02-23 | Stop reason: HOSPADM

## 2018-02-21 RX ORDER — CARBOPROST TROMETHAMINE 250 UG/ML
250 INJECTION, SOLUTION INTRAMUSCULAR AS NEEDED
Status: CANCELLED | OUTPATIENT
Start: 2018-02-21

## 2018-02-21 RX ORDER — SODIUM CHLORIDE, SODIUM LACTATE, POTASSIUM CHLORIDE, CALCIUM CHLORIDE 600; 310; 30; 20 MG/100ML; MG/100ML; MG/100ML; MG/100ML
125 INJECTION, SOLUTION INTRAVENOUS CONTINUOUS
Status: DISCONTINUED | OUTPATIENT
Start: 2018-02-21 | End: 2018-02-23 | Stop reason: HOSPADM

## 2018-02-21 RX ORDER — MISOPROSTOL 200 UG/1
800 TABLET ORAL AS NEEDED
Status: CANCELLED | OUTPATIENT
Start: 2018-02-21

## 2018-02-21 RX ORDER — METHYLERGONOVINE MALEATE 0.2 MG/ML
200 INJECTION INTRAVENOUS ONCE AS NEEDED
Status: CANCELLED | OUTPATIENT
Start: 2018-02-21

## 2018-02-21 RX ORDER — SODIUM CHLORIDE 0.9 % (FLUSH) 0.9 %
1-10 SYRINGE (ML) INJECTION AS NEEDED
Status: DISCONTINUED | OUTPATIENT
Start: 2018-02-21 | End: 2018-02-23 | Stop reason: HOSPADM

## 2018-02-21 RX ORDER — GUAIFENESIN/DEXTROMETHORPHAN 100-10MG/5
5 SYRUP ORAL EVERY 4 HOURS PRN
Status: DISCONTINUED | OUTPATIENT
Start: 2018-02-21 | End: 2018-02-23 | Stop reason: HOSPADM

## 2018-02-21 RX ADMIN — GUAIFENESIN AND DEXTROMETHORPHAN 5 ML: 100; 10 SYRUP ORAL at 21:05

## 2018-02-21 RX ADMIN — FAMOTIDINE 20 MG: 10 INJECTION, SOLUTION INTRAVENOUS at 21:00

## 2018-02-21 RX ADMIN — DINOPROSTONE 10 MG: 10 INSERT VAGINAL at 20:45

## 2018-02-22 ENCOUNTER — ANESTHESIA (OUTPATIENT)
Dept: OBSTETRICS AND GYNECOLOGY | Facility: HOSPITAL | Age: 23
End: 2018-02-22

## 2018-02-22 ENCOUNTER — ANESTHESIA EVENT (OUTPATIENT)
Dept: OBSTETRICS AND GYNECOLOGY | Facility: HOSPITAL | Age: 23
End: 2018-02-22

## 2018-02-22 LAB
BACTERIA UR QL AUTO: ABNORMAL /HPF
BILIRUB UR QL STRIP: NEGATIVE
CLARITY UR: CLEAR
COLOR UR: YELLOW
GLUCOSE UR STRIP-MCNC: NEGATIVE MG/DL
HGB UR QL STRIP.AUTO: NEGATIVE
HYALINE CASTS UR QL AUTO: ABNORMAL /LPF
KETONES UR QL STRIP: NEGATIVE
LEUKOCYTE ESTERASE UR QL STRIP.AUTO: ABNORMAL
NITRITE UR QL STRIP: NEGATIVE
PH UR STRIP.AUTO: 8 [PH] (ref 4.5–8)
PROT UR QL STRIP: NEGATIVE
RBC # UR: ABNORMAL /HPF
REF LAB TEST METHOD: ABNORMAL
SP GR UR STRIP: 1.01 (ref 1–1.03)
SQUAMOUS #/AREA URNS HPF: ABNORMAL /HPF
UROBILINOGEN UR QL STRIP: ABNORMAL
WBC UR QL AUTO: ABNORMAL /HPF

## 2018-02-22 PROCEDURE — 3E033VJ INTRODUCTION OF OTHER HORMONE INTO PERIPHERAL VEIN, PERCUTANEOUS APPROACH: ICD-10-PCS | Performed by: OBSTETRICS & GYNECOLOGY

## 2018-02-22 PROCEDURE — 25010000002 ONDANSETRON PER 1 MG: Performed by: OBSTETRICS & GYNECOLOGY

## 2018-02-22 PROCEDURE — 25010000002 PENICILLIN G POTASSIUM PER 600000 UNITS

## 2018-02-22 PROCEDURE — 25010000002 FENTANYL CITRATE (PF) 100 MCG/2ML SOLUTION

## 2018-02-22 PROCEDURE — 25010000002 FENTANYL CITRATE (PF) 100 MCG/2ML SOLUTION: Performed by: NURSE ANESTHETIST, CERTIFIED REGISTERED

## 2018-02-22 PROCEDURE — 10907ZC DRAINAGE OF AMNIOTIC FLUID, THERAPEUTIC FROM PRODUCTS OF CONCEPTION, VIA NATURAL OR ARTIFICIAL OPENING: ICD-10-PCS | Performed by: OBSTETRICS & GYNECOLOGY

## 2018-02-22 PROCEDURE — 59410 OBSTETRICAL CARE: CPT | Performed by: OBSTETRICS & GYNECOLOGY

## 2018-02-22 PROCEDURE — 25010000002 PENICILLIN G POTASSIUM PER 600000 UNITS: Performed by: OBSTETRICS & GYNECOLOGY

## 2018-02-22 PROCEDURE — C1755 CATHETER, INTRASPINAL: HCPCS | Performed by: NURSE ANESTHETIST, CERTIFIED REGISTERED

## 2018-02-22 RX ORDER — MAGNESIUM CARB/ALUMINUM HYDROX 105-160MG
TABLET,CHEWABLE ORAL
Status: DISCONTINUED
Start: 2018-02-22 | End: 2018-02-22 | Stop reason: WASHOUT

## 2018-02-22 RX ORDER — FENTANYL CITRATE 50 UG/ML
INJECTION, SOLUTION INTRAMUSCULAR; INTRAVENOUS
Status: COMPLETED
Start: 2018-02-22 | End: 2018-02-22

## 2018-02-22 RX ORDER — FENTANYL CITRATE 50 UG/ML
INJECTION, SOLUTION INTRAMUSCULAR; INTRAVENOUS AS NEEDED
Status: DISCONTINUED | OUTPATIENT
Start: 2018-02-22 | End: 2018-02-22 | Stop reason: SURG

## 2018-02-22 RX ORDER — OXYTOCIN 10 [USP'U]/ML
INJECTION, SOLUTION INTRAMUSCULAR; INTRAVENOUS
Status: DISPENSED
Start: 2018-02-22 | End: 2018-02-22

## 2018-02-22 RX ORDER — FENTANYL CITRATE 50 UG/ML
100 INJECTION, SOLUTION INTRAMUSCULAR; INTRAVENOUS
Status: DISCONTINUED | OUTPATIENT
Start: 2018-02-22 | End: 2018-02-23 | Stop reason: HOSPADM

## 2018-02-22 RX ORDER — OXYTOCIN/RINGER'S LACTATE 20/1000 ML
1000 PLASTIC BAG, INJECTION (ML) INTRAVENOUS CONTINUOUS
Status: ACTIVE | OUTPATIENT
Start: 2018-02-22 | End: 2018-02-23

## 2018-02-22 RX ORDER — BUPIVACAINE HYDROCHLORIDE 5 MG/ML
INJECTION, SOLUTION EPIDURAL; INTRACAUDAL AS NEEDED
Status: DISCONTINUED | OUTPATIENT
Start: 2018-02-22 | End: 2018-02-22 | Stop reason: SURG

## 2018-02-22 RX ORDER — MAGNESIUM SULFATE HEPTAHYDRATE 40 MG/ML
2 INJECTION, SOLUTION INTRAVENOUS CONTINUOUS
Status: DISPENSED | OUTPATIENT
Start: 2018-02-22 | End: 2018-02-23

## 2018-02-22 RX ORDER — DOCUSATE SODIUM 100 MG/1
100 CAPSULE, LIQUID FILLED ORAL DAILY
Status: DISCONTINUED | OUTPATIENT
Start: 2018-02-23 | End: 2018-02-23 | Stop reason: HOSPADM

## 2018-02-22 RX ORDER — BISACODYL 10 MG
10 SUPPOSITORY, RECTAL RECTAL DAILY PRN
Status: DISCONTINUED | OUTPATIENT
Start: 2018-02-23 | End: 2018-02-23 | Stop reason: HOSPADM

## 2018-02-22 RX ORDER — OXYTOCIN/RINGER'S LACTATE 20/1000 ML
2 PLASTIC BAG, INJECTION (ML) INTRAVENOUS
Status: DISCONTINUED | OUTPATIENT
Start: 2018-02-22 | End: 2018-02-23 | Stop reason: HOSPADM

## 2018-02-22 RX ORDER — MISOPROSTOL 200 UG/1
600 TABLET ORAL ONCE
Status: DISCONTINUED | OUTPATIENT
Start: 2018-02-22 | End: 2018-02-23 | Stop reason: HOSPADM

## 2018-02-22 RX ORDER — SODIUM CHLORIDE 0.9 % (FLUSH) 0.9 %
1-10 SYRINGE (ML) INJECTION AS NEEDED
Status: DISCONTINUED | OUTPATIENT
Start: 2018-02-22 | End: 2018-02-23 | Stop reason: HOSPADM

## 2018-02-22 RX ORDER — DOCUSATE SODIUM 100 MG/1
100 CAPSULE, LIQUID FILLED ORAL 2 TIMES DAILY PRN
Status: DISCONTINUED | OUTPATIENT
Start: 2018-02-22 | End: 2018-02-23 | Stop reason: HOSPADM

## 2018-02-22 RX ORDER — SUFENTANIL CITRATE 50 UG/ML
INJECTION EPIDURAL; INTRAVENOUS
Status: DISPENSED
Start: 2018-02-22 | End: 2018-02-23

## 2018-02-22 RX ORDER — HYDROCODONE BITARTRATE AND ACETAMINOPHEN 5; 325 MG/1; MG/1
1 TABLET ORAL EVERY 4 HOURS PRN
Status: DISCONTINUED | OUTPATIENT
Start: 2018-02-22 | End: 2018-02-23 | Stop reason: HOSPADM

## 2018-02-22 RX ORDER — LIDOCAINE HYDROCHLORIDE AND EPINEPHRINE 15; 5 MG/ML; UG/ML
INJECTION, SOLUTION EPIDURAL AS NEEDED
Status: DISCONTINUED | OUTPATIENT
Start: 2018-02-22 | End: 2018-02-22 | Stop reason: SURG

## 2018-02-22 RX ORDER — HYDROCODONE BITARTRATE AND ACETAMINOPHEN 5; 325 MG/1; MG/1
2 TABLET ORAL EVERY 4 HOURS PRN
Status: DISCONTINUED | OUTPATIENT
Start: 2018-02-22 | End: 2018-02-23 | Stop reason: HOSPADM

## 2018-02-22 RX ORDER — ERYTHROMYCIN 5 MG/G
OINTMENT OPHTHALMIC
Status: DISCONTINUED
Start: 2018-02-22 | End: 2018-02-22 | Stop reason: WASHOUT

## 2018-02-22 RX ORDER — SODIUM CHLORIDE, SODIUM LACTATE, POTASSIUM CHLORIDE, CALCIUM CHLORIDE 600; 310; 30; 20 MG/100ML; MG/100ML; MG/100ML; MG/100ML
100 INJECTION, SOLUTION INTRAVENOUS CONTINUOUS
Status: DISCONTINUED | OUTPATIENT
Start: 2018-02-22 | End: 2018-02-23 | Stop reason: HOSPADM

## 2018-02-22 RX ORDER — ONDANSETRON 2 MG/ML
4 INJECTION INTRAMUSCULAR; INTRAVENOUS EVERY 6 HOURS PRN
Status: DISCONTINUED | OUTPATIENT
Start: 2018-02-22 | End: 2018-02-23 | Stop reason: HOSPADM

## 2018-02-22 RX ORDER — PENICILLIN G 3000000 [IU]/50ML
3 INJECTION, SOLUTION INTRAVENOUS EVERY 4 HOURS
Status: DISCONTINUED | OUTPATIENT
Start: 2018-02-22 | End: 2018-02-23 | Stop reason: HOSPADM

## 2018-02-22 RX ORDER — PENICILLIN G POTASSIUM 5000000 [IU]/1
INJECTION, POWDER, FOR SOLUTION INTRAMUSCULAR; INTRAVENOUS
Status: COMPLETED
Start: 2018-02-22 | End: 2018-02-22

## 2018-02-22 RX ORDER — IBUPROFEN 800 MG/1
800 TABLET ORAL EVERY 8 HOURS PRN
Status: DISCONTINUED | OUTPATIENT
Start: 2018-02-22 | End: 2018-02-23 | Stop reason: HOSPADM

## 2018-02-22 RX ORDER — ZOLPIDEM TARTRATE 5 MG/1
5 TABLET ORAL NIGHTLY PRN
Status: DISCONTINUED | OUTPATIENT
Start: 2018-02-22 | End: 2018-02-23 | Stop reason: HOSPADM

## 2018-02-22 RX ORDER — LANOLIN 100 %
OINTMENT (GRAM) TOPICAL
Status: DISCONTINUED | OUTPATIENT
Start: 2018-02-22 | End: 2018-02-23 | Stop reason: HOSPADM

## 2018-02-22 RX ORDER — PHYTONADIONE 1 MG/.5ML
INJECTION, EMULSION INTRAMUSCULAR; INTRAVENOUS; SUBCUTANEOUS
Status: DISCONTINUED
Start: 2018-02-22 | End: 2018-02-22 | Stop reason: WASHOUT

## 2018-02-22 RX ORDER — PRENATAL VIT NO.126/IRON/FOLIC 28MG-0.8MG
1 TABLET ORAL DAILY
Status: DISCONTINUED | OUTPATIENT
Start: 2018-02-23 | End: 2018-02-23 | Stop reason: HOSPADM

## 2018-02-22 RX ADMIN — FENTANYL CITRATE 100 MCG: 50 INJECTION, SOLUTION INTRAMUSCULAR; INTRAVENOUS at 03:41

## 2018-02-22 RX ADMIN — FENTANYL CITRATE 100 MCG: 50 INJECTION, SOLUTION INTRAMUSCULAR; INTRAVENOUS at 11:20

## 2018-02-22 RX ADMIN — PENICILLIN G 3 MILLION UNITS: 3000000 INJECTION, SOLUTION INTRAVENOUS at 17:35

## 2018-02-22 RX ADMIN — GUAIFENESIN AND DEXTROMETHORPHAN 5 ML: 100; 10 SYRUP ORAL at 01:30

## 2018-02-22 RX ADMIN — BUPIVACAINE HYDROCHLORIDE 5 ML: 5 INJECTION, SOLUTION EPIDURAL; INTRACAUDAL; PERINEURAL at 17:10

## 2018-02-22 RX ADMIN — IBUPROFEN 800 MG: 800 TABLET ORAL at 23:24

## 2018-02-22 RX ADMIN — FENTANYL CITRATE 100 MCG: 50 INJECTION, SOLUTION INTRAMUSCULAR; INTRAVENOUS at 17:10

## 2018-02-22 RX ADMIN — FENTANYL CITRATE 100 MCG: 50 INJECTION, SOLUTION INTRAMUSCULAR; INTRAVENOUS at 09:30

## 2018-02-22 RX ADMIN — LIDOCAINE HYDROCHLORIDE,EPINEPHRINE BITARTRATE 10 ML: 15; .005 INJECTION, SOLUTION EPIDURAL; INFILTRATION; INTRACAUDAL; PERINEURAL at 19:12

## 2018-02-22 RX ADMIN — SODIUM CHLORIDE, POTASSIUM CHLORIDE, SODIUM LACTATE AND CALCIUM CHLORIDE 125 ML/HR: 600; 310; 30; 20 INJECTION, SOLUTION INTRAVENOUS at 08:50

## 2018-02-22 RX ADMIN — SODIUM CHLORIDE, POTASSIUM CHLORIDE, SODIUM LACTATE AND CALCIUM CHLORIDE 999 ML/HR: 600; 310; 30; 20 INJECTION, SOLUTION INTRAVENOUS at 13:55

## 2018-02-22 RX ADMIN — GUAIFENESIN AND DEXTROMETHORPHAN 5 ML: 100; 10 SYRUP ORAL at 07:15

## 2018-02-22 RX ADMIN — SUFENTANIL CITRATE 12 ML/HR: 50 INJECTION EPIDURAL; INTRAVENOUS at 13:56

## 2018-02-22 RX ADMIN — ONDANSETRON 4 MG: 2 INJECTION, SOLUTION INTRAMUSCULAR; INTRAVENOUS at 09:30

## 2018-02-22 RX ADMIN — OXYTOCIN 2 MILLI-UNITS/MIN: 10 INJECTION, SOLUTION INTRAMUSCULAR; INTRAVENOUS at 09:15

## 2018-02-22 RX ADMIN — FENTANYL CITRATE 100 MCG: 50 INJECTION, SOLUTION INTRAMUSCULAR; INTRAVENOUS at 13:10

## 2018-02-22 RX ADMIN — GUAIFENESIN AND DEXTROMETHORPHAN 5 ML: 100; 10 SYRUP ORAL at 12:50

## 2018-02-22 RX ADMIN — ONDANSETRON 4 MG: 2 INJECTION, SOLUTION INTRAMUSCULAR; INTRAVENOUS at 14:10

## 2018-02-22 RX ADMIN — PENICILLIN G 3 MILLION UNITS: 3000000 INJECTION, SOLUTION INTRAVENOUS at 14:05

## 2018-02-22 RX ADMIN — PENICILLIN G POTASSIUM 5 MILLION UNITS: 5000000 POWDER, FOR SOLUTION INTRAMUSCULAR; INTRAPLEURAL; INTRATHECAL; INTRAVENOUS at 09:45

## 2018-02-22 RX ADMIN — LIDOCAINE HYDROCHLORIDE,EPINEPHRINE BITARTRATE 3 ML: 15; .005 INJECTION, SOLUTION EPIDURAL; INFILTRATION; INTRACAUDAL; PERINEURAL at 13:42

## 2018-02-22 NOTE — ANESTHESIA PROCEDURE NOTES
Labor Epidural    Patient location during procedure: OB  Start Time: 2/22/2018 1:36 PM  Preanesthetic Checklist  Completed: patient identified, surgical consent, pre-op evaluation, timeout performed, IV checked, risks and benefits discussed and monitors and equipment checked  Prep:  Pt Position:sitting  Sterile Tech:cap, gloves, mask and sterile barrier  Prep:povidone-iodine 7.5% surgical scrub  Monitoring:blood pressure monitoring, continuous pulse oximetry and EKG  Epidural Block Procedure:  Approach:midline  Guidance:landmark technique  Location:L3-L4  Needle Type:Tuohy  Needle Gauge:17 G  Loss of Resistance Medium: air  Paresthesia: none  Aspiration:negative  Test Dose:negative  Number of Attempts: 1  Post Assessment:  Dressing:biopatch applied, occlusive dressing applied and secured with tape  Pt Tolerance:patient tolerated the procedure well with no apparent complications  Complications:no

## 2018-02-22 NOTE — ANESTHESIA PREPROCEDURE EVALUATION
Anesthesia Evaluation     Patient summary reviewed and Nursing notes reviewed   no history of anesthetic complications:  NPO Solid Status: > 8 hours  NPO Liquid Status: > 2 hours           Airway   Mallampati: II  TM distance: >3 FB  Neck ROM: full  no difficulty expected  Dental - normal exam     Pulmonary - normal exam    breath sounds clear to auscultation  (+) recent URI (current),   Cardiovascular - normal exam    Rhythm: regular  Rate: normal    (+) hypertension,       Neuro/Psych- negative ROS  GI/Hepatic/Renal/Endo    (+) obesity,  GERD well controlled,     Musculoskeletal (-) negative ROS    Abdominal   (+) obese,    Substance History - negative use     OB/GYN    (+) Pregnant, Preeclampsia, pregnancy induced hypertension        Other - negative ROS                       Anesthesia Plan    ASA 2     epidural     Anesthetic plan and risks discussed with patient.  Use of blood products discussed with patient  Consented to blood products.

## 2018-02-23 ENCOUNTER — HOSPITAL ENCOUNTER (OUTPATIENT)
Facility: HOSPITAL | Age: 23
Setting detail: OBSERVATION
Discharge: HOME | End: 2018-02-24
Attending: OBSTETRICS & GYNECOLOGY | Admitting: OBSTETRICS & GYNECOLOGY

## 2018-02-23 ENCOUNTER — TELEPHONE (OUTPATIENT)
Dept: OBSTETRICS AND GYNECOLOGY | Facility: CLINIC | Age: 23
End: 2018-02-23

## 2018-02-23 VITALS
DIASTOLIC BLOOD PRESSURE: 66 MMHG | TEMPERATURE: 98.4 F | BODY MASS INDEX: 34.49 KG/M2 | WEIGHT: 207 LBS | HEART RATE: 110 BPM | HEIGHT: 65 IN | RESPIRATION RATE: 20 BRPM | SYSTOLIC BLOOD PRESSURE: 126 MMHG

## 2018-02-23 PROBLEM — N93.9 VAGINAL BLEEDING: Status: ACTIVE | Noted: 2018-02-23

## 2018-02-23 LAB
ABO GROUP BLD: NORMAL
BLD GP AB SCN SERPL QL: NEGATIVE
DEPRECATED RDW RBC AUTO: 45 FL (ref 37–54)
ERYTHROCYTE [DISTWIDTH] IN BLOOD BY AUTOMATED COUNT: 14.4 % (ref 11.5–14.5)
HCT VFR BLD AUTO: 25.7 % (ref 37–47)
HCT VFR BLD AUTO: 27.4 % (ref 37–47)
HGB BLD-MCNC: 8.6 G/DL (ref 12–16)
HGB BLD-MCNC: 9.2 G/DL (ref 12–16)
MCH RBC QN AUTO: 29.6 PG (ref 27–31)
MCHC RBC AUTO-ENTMCNC: 33.6 G/DL (ref 31–37)
MCV RBC AUTO: 88.1 FL (ref 81–99)
PLATELET # BLD AUTO: 279 10*3/MM3 (ref 140–500)
PMV BLD AUTO: 10.2 FL (ref 7.4–10.4)
RBC # BLD AUTO: 3.11 10*6/MM3 (ref 4.2–5.4)
RH BLD: POSITIVE
WBC NRBC COR # BLD: 15.99 10*3/MM3 (ref 4.8–10.8)

## 2018-02-23 PROCEDURE — 86900 BLOOD TYPING SEROLOGIC ABO: CPT | Performed by: OBSTETRICS & GYNECOLOGY

## 2018-02-23 PROCEDURE — G0378 HOSPITAL OBSERVATION PER HR: HCPCS

## 2018-02-23 PROCEDURE — 99024 POSTOP FOLLOW-UP VISIT: CPT | Performed by: OBSTETRICS & GYNECOLOGY

## 2018-02-23 PROCEDURE — 85018 HEMOGLOBIN: CPT | Performed by: OBSTETRICS & GYNECOLOGY

## 2018-02-23 PROCEDURE — 86850 RBC ANTIBODY SCREEN: CPT | Performed by: OBSTETRICS & GYNECOLOGY

## 2018-02-23 PROCEDURE — 85027 COMPLETE CBC AUTOMATED: CPT | Performed by: OBSTETRICS & GYNECOLOGY

## 2018-02-23 PROCEDURE — 85014 HEMATOCRIT: CPT | Performed by: OBSTETRICS & GYNECOLOGY

## 2018-02-23 PROCEDURE — 86901 BLOOD TYPING SEROLOGIC RH(D): CPT | Performed by: OBSTETRICS & GYNECOLOGY

## 2018-02-23 RX ORDER — LIDOCAINE HYDROCHLORIDE 10 MG/ML
5 INJECTION, SOLUTION EPIDURAL; INFILTRATION; INTRACAUDAL; PERINEURAL AS NEEDED
Status: DISCONTINUED | OUTPATIENT
Start: 2018-02-23 | End: 2018-02-24 | Stop reason: HOSPADM

## 2018-02-23 RX ORDER — IBUPROFEN 800 MG/1
800 TABLET ORAL EVERY 8 HOURS PRN
Status: DISCONTINUED | OUTPATIENT
Start: 2018-02-23 | End: 2018-02-24 | Stop reason: HOSPADM

## 2018-02-23 RX ORDER — HYDROCODONE BITARTRATE AND ACETAMINOPHEN 5; 325 MG/1; MG/1
2 TABLET ORAL EVERY 4 HOURS PRN
Status: DISCONTINUED | OUTPATIENT
Start: 2018-02-23 | End: 2018-02-24 | Stop reason: HOSPADM

## 2018-02-23 RX ORDER — SODIUM CHLORIDE, SODIUM LACTATE, POTASSIUM CHLORIDE, CALCIUM CHLORIDE 600; 310; 30; 20 MG/100ML; MG/100ML; MG/100ML; MG/100ML
150 INJECTION, SOLUTION INTRAVENOUS CONTINUOUS
Status: DISCONTINUED | OUTPATIENT
Start: 2018-02-23 | End: 2018-02-24 | Stop reason: HOSPADM

## 2018-02-23 RX ORDER — FAMOTIDINE 20 MG/1
20 TABLET, FILM COATED ORAL 2 TIMES DAILY PRN
Status: DISCONTINUED | OUTPATIENT
Start: 2018-02-23 | End: 2018-02-23 | Stop reason: HOSPADM

## 2018-02-23 RX ORDER — IBUPROFEN 800 MG/1
800 TABLET ORAL EVERY 8 HOURS PRN
Qty: 30 TABLET | Refills: 1 | Status: SHIPPED | OUTPATIENT
Start: 2018-02-23 | End: 2018-03-02

## 2018-02-23 RX ORDER — FERROUS SULFATE 325(65) MG
325 TABLET ORAL
Qty: 90 TABLET | Refills: 0 | Status: SHIPPED | OUTPATIENT
Start: 2018-02-23 | End: 2018-03-02

## 2018-02-23 RX ORDER — MISOPROSTOL 200 UG/1
200 TABLET ORAL EVERY 6 HOURS SCHEDULED
Status: DISCONTINUED | OUTPATIENT
Start: 2018-02-23 | End: 2018-02-24 | Stop reason: HOSPADM

## 2018-02-23 RX ORDER — MISOPROSTOL 200 UG/1
TABLET ORAL
Status: COMPLETED
Start: 2018-02-23 | End: 2018-02-23

## 2018-02-23 RX ORDER — HYDROCODONE BITARTRATE AND ACETAMINOPHEN 5; 325 MG/1; MG/1
1 TABLET ORAL EVERY 4 HOURS PRN
Qty: 15 TABLET | Refills: 0 | Status: SHIPPED | OUTPATIENT
Start: 2018-02-23 | End: 2018-03-02

## 2018-02-23 RX ORDER — SODIUM CHLORIDE 0.9 % (FLUSH) 0.9 %
1-10 SYRINGE (ML) INJECTION AS NEEDED
Status: DISCONTINUED | OUTPATIENT
Start: 2018-02-23 | End: 2018-02-24 | Stop reason: HOSPADM

## 2018-02-23 RX ADMIN — IBUPROFEN 800 MG: 800 TABLET ORAL at 09:00

## 2018-02-23 RX ADMIN — DOCUSATE SODIUM 100 MG: 100 CAPSULE, LIQUID FILLED ORAL at 09:00

## 2018-02-23 RX ADMIN — Medication 1 TABLET: at 09:00

## 2018-02-23 RX ADMIN — IBUPROFEN 800 MG: 800 TABLET ORAL at 21:50

## 2018-02-23 RX ADMIN — MISOPROSTOL 200 MCG: 200 TABLET ORAL at 21:52

## 2018-02-23 RX ADMIN — HYDROCODONE BITARTRATE AND ACETAMINOPHEN 2 TABLET: 5; 325 TABLET ORAL at 00:27

## 2018-02-23 RX ADMIN — HYDROCODONE BITARTRATE AND ACETAMINOPHEN 2 TABLET: 5; 325 TABLET ORAL at 04:41

## 2018-02-23 RX ADMIN — HYDROCODONE BITARTRATE AND ACETAMINOPHEN 2 TABLET: 5; 325 TABLET ORAL at 21:50

## 2018-02-23 RX ADMIN — HYDROCODONE BITARTRATE AND ACETAMINOPHEN 1 TABLET: 5; 325 TABLET ORAL at 12:22

## 2018-02-23 NOTE — ANESTHESIA PROCEDURE NOTES
Labor Epidural    Patient location during procedure: OB  Performed By  CRNA: CHRISS CERVANTES  Preanesthetic Checklist  Completed: patient identified, surgical consent, pre-op evaluation, timeout performed, IV checked, risks and benefits discussed and monitors and equipment checked  Additional Notes  Replaced epidural after c/o pain in left back and attempted boluses and change of position unsuccessful in reducing pain to an acceptable level. Placed one space above previous epidural site. Bolussed with 10 ml 1.5% Lido with epi. -- stated immediate relief.  Prep:  Pt Position:sitting  Sterile Tech:cap, gloves, mask and gown  Prep:povidone-iodine 7.5% surgical scrub  Monitoring:blood pressure monitoring, continuous pulse oximetry and EKG  Epidural Block Procedure:  Approach:midline  Guidance:landmark technique and palpation technique  Location:L3-L4  Needle Type:Tuohy  Needle Gauge:17  Loss of Resistance Medium: saline  Cath Depth at skin:5 cm  Paresthesia: none  Aspiration:negative  Test Dose:negative  Number of Attempts: 1  Post Assessment:  Dressing:occlusive dressing applied  Pt Tolerance:patient tolerated the procedure well with no apparent complications  Complications:no

## 2018-02-23 NOTE — ANESTHESIA POSTPROCEDURE EVALUATION
Patient: Yasmin SORIA    Procedure Summary     Date Anesthesia Start Anesthesia Stop Room / Location    02/22/18 1329 2100        Procedure Diagnosis Scheduled Providers Provider    LABOR ANALGESIA No diagnosis on file.  Adam Hammer CRNA          Anesthesia Type: epidural  Last vitals  BP   119/61 (02/22/18 1920)   Temp   98.6 °F (37 °C) (02/22/18 1900)   Pulse   (!) 133 (02/22/18 1920)   Resp   20 (02/22/18 1900)     SpO2         Post Anesthesia Care and Evaluation    Patient location during evaluation: bedside  Patient participation: complete - patient participated  Level of consciousness: awake and alert  Pain score: 0  Pain management: adequate  Airway patency: patent  Anesthetic complications: No anesthetic complications    Cardiovascular status: acceptable  Respiratory status: acceptable  Hydration status: acceptable

## 2018-02-24 VITALS
OXYGEN SATURATION: 96 % | DIASTOLIC BLOOD PRESSURE: 53 MMHG | HEART RATE: 84 BPM | TEMPERATURE: 98.4 F | RESPIRATION RATE: 18 BRPM | SYSTOLIC BLOOD PRESSURE: 93 MMHG

## 2018-02-24 LAB
HCT VFR BLD AUTO: 25.9 % (ref 37–47)
HGB BLD-MCNC: 8.3 G/DL (ref 12–16)

## 2018-02-24 PROCEDURE — 85018 HEMOGLOBIN: CPT | Performed by: OBSTETRICS & GYNECOLOGY

## 2018-02-24 PROCEDURE — G0378 HOSPITAL OBSERVATION PER HR: HCPCS

## 2018-02-24 PROCEDURE — 85014 HEMATOCRIT: CPT | Performed by: OBSTETRICS & GYNECOLOGY

## 2018-02-24 RX ORDER — METHYLERGONOVINE MALEATE 0.2 MG/ML
200 INJECTION INTRAVENOUS ONCE AS NEEDED
Status: DISCONTINUED | OUTPATIENT
Start: 2018-02-24 | End: 2018-02-24 | Stop reason: HOSPADM

## 2018-02-24 RX ORDER — MISOPROSTOL 200 UG/1
800 TABLET ORAL AS NEEDED
Status: DISCONTINUED | OUTPATIENT
Start: 2018-02-24 | End: 2018-02-24 | Stop reason: HOSPADM

## 2018-02-24 RX ORDER — MISOPROSTOL 200 UG/1
200 TABLET ORAL 4 TIMES DAILY
Qty: 2 TABLET | Refills: 0 | Status: SHIPPED | OUTPATIENT
Start: 2018-02-24 | End: 2018-03-02

## 2018-02-24 RX ORDER — OXYTOCIN/RINGER'S LACTATE 20/1000 ML
2 PLASTIC BAG, INJECTION (ML) INTRAVENOUS CONTINUOUS
Status: DISCONTINUED | OUTPATIENT
Start: 2018-02-24 | End: 2018-02-24 | Stop reason: HOSPADM

## 2018-02-24 RX ORDER — CARBOPROST TROMETHAMINE 250 UG/ML
250 INJECTION, SOLUTION INTRAMUSCULAR AS NEEDED
Status: DISCONTINUED | OUTPATIENT
Start: 2018-02-24 | End: 2018-02-24 | Stop reason: HOSPADM

## 2018-02-24 RX ADMIN — MISOPROSTOL 200 MCG: 200 TABLET ORAL at 05:44

## 2018-02-24 NOTE — PLAN OF CARE
Problem: Patient Care Overview (Adult)  Goal: Plan of Care Review  Outcome: Ongoing (interventions implemented as appropriate)   02/23/18 7274   Coping/Psychosocial Response Interventions   Plan Of Care Reviewed With patient   Patient Care Overview   Progress no change   Outcome Evaluation   Outcome Summary/Follow up Plan Monitoring VSS, vaginal bleeding, fundal checks and pain control     Goal: Adult Individualization and Mutuality  Outcome: Ongoing (interventions implemented as appropriate)    Goal: Discharge Needs Assessment  Outcome: Ongoing (interventions implemented as appropriate)      Problem: Postpartum, Vaginal Delivery (Adult)  Goal: Signs and Symptoms of Listed Potential Problems Will be Absent or Manageable (Postpartum, Vaginal Delivery)  Outcome: Ongoing (interventions implemented as appropriate)

## 2018-02-25 ENCOUNTER — DOCUMENTATION (OUTPATIENT)
Dept: OBSTETRICS AND GYNECOLOGY | Facility: CLINIC | Age: 23
End: 2018-02-25

## 2018-02-25 NOTE — PROGRESS NOTES
Date of Admission: 2018    Date of Discharge:  2018  Admitting Service: [facility]    Admission Diagnosis: PP vaginal bleeding  Discharge Diagnosis: PP vaginal bleeding          Hospital Course  Patient is a 22 y.o. female  s/p vaginal delivery on 18. Pt had been diagnosed with mild pre eclampsia and IOL at 37 weeks. Baby was subsequently transferred to NICU and intubated for suspected TTN. Pt was started on Magnesium Sulfate for 12 hrs PP and had excellent diuresis and well controlled blood pressures. Pt was discharged home on PPD #1 to be with her baby. Pt was eating at [employer] and started to have a lot of VB that soaked through her clothes. Pt was brought to [facility]. Once here the VB had significantly slowed down. She had scant bleeding and vital signs were stable. A stat hgb was 9.2. Her discharge hgb had been 8.6. Pt was admitted for IVF and Cytotec 200mg po q 6hrs. Pt reports minimal bleeding and only with wiping. Her hgb on day of discharge is 8.3. Pt will be discharged to home with 2 additional doses of Cytotec so she has 24hrs of treatment. Pt was given precautions to call with heavy VB again. Pt was discharged home in stable condition and to FU in 1 week to check blood pressure and evaluate bleeding.     Procedures Performed  none    Consults:   none    Pertinent Test Results: hgb 8.3    Condition on Discharge:  stable    Vital Signs       Physical Exam:  Gen: NAD  Hrt: RRR  Lungs: clear  Abd: soft, fundus firma nd below umbilicus.  Ext: No calf tenderness    Discharge Disposition      Discharge Medications   Yasmin SORIA   Home Medication Instructions MONICA:    Printed on:18 1142   Medication Information                      docusate sodium (COLACE) 100 MG capsule  Take 1 capsule by mouth 2 (Two) Times a Day As Needed for Constipation.             ferrous sulfate 325 (65 FE) MG tablet  Take 1 tablet by mouth Daily With Breakfast.             HYDROcodone-acetaminophen  (NORCO) 5-325 MG per tablet  Take 1 tablet by mouth Every 4 (Four) Hours As Needed for Moderate Pain  for up to 9 days.             ibuprofen (ADVIL,MOTRIN) 800 MG tablet  Take 1 tablet by mouth Every 8 (Eight) Hours As Needed for Mild Pain .             misoprostol (CYTOTEC) 200 MCG tablet  Take 1 tablet by mouth 4 (Four) Times a Day.             Prenatal Vit-Fe Fumarate-FA (PRENATAL, CLASSIC, VITAMIN) 28-0.8 MG tablet tablet  Take  by mouth Daily.                 Discharge Diet: regular    Activity at Discharge: pelvic rest.    Follow-up Appointments  1 week    Test Results Pending at Discharge  none     Kacey Jeong DO  02/25/18  11:41 AM

## 2018-03-02 ENCOUNTER — POSTPARTUM VISIT (OUTPATIENT)
Dept: OBSTETRICS AND GYNECOLOGY | Facility: CLINIC | Age: 23
End: 2018-03-02

## 2018-03-02 VITALS
SYSTOLIC BLOOD PRESSURE: 122 MMHG | WEIGHT: 188 LBS | HEIGHT: 65 IN | DIASTOLIC BLOOD PRESSURE: 72 MMHG | BODY MASS INDEX: 31.32 KG/M2

## 2018-03-02 DIAGNOSIS — O14.03 MILD PREECLAMPSIA, THIRD TRIMESTER: Primary | ICD-10-CM

## 2018-03-02 LAB
LAB AP CASE REPORT: NORMAL
Lab: NORMAL
PATH REPORT.FINAL DX SPEC: NORMAL

## 2018-03-02 PROCEDURE — 99212 OFFICE O/P EST SF 10 MIN: CPT | Performed by: OBSTETRICS & GYNECOLOGY

## 2018-03-02 NOTE — PROGRESS NOTES
"Patient Care Team:  No Known Provider as PCP - General    Patient new to practice? no Patient new to examiner? no     -----------------------------------------------------HISTORY---------------------------------------------------    Chief Complaint:   Chief Complaint   Patient presents with   • Postpartum Care     B/P check, F/U bleeding     New problem to examiner? no    Patient's last menstrual period was 06/07/2017 (exact date).      HPI:     Pt here for bp check only.  Doing well.  Baby coming home.    HPI      Review of Systems   Constitutional: Negative.    HENT: Negative.    Eyes: Negative.    Respiratory: Negative.    Cardiovascular: Negative.    Gastrointestinal: Negative.    Endocrine: Negative.    Genitourinary: Negative.    Musculoskeletal: Negative.    Skin: Negative.    Allergic/Immunologic: Negative.    Neurological: Negative.    Hematological: Negative.    Psychiatric/Behavioral: Negative.    :      PFSH: PAST HISTORY REVIEWED     1.    Past Medical History:   Diagnosis Date   • GERD (gastroesophageal reflux disease) 11/20/2017   • Preeclampsia            Status of:      2.   Family History   Problem Relation Age of Onset   • No Known Problems Father    • No Known Problems Mother    • No Known Problems Brother    • No Known Problems Sister            4.   Past Surgical History:   Procedure Laterality Date   • CHOLECYSTECTOMY     • WISDOM TOOTH EXTRACTION          5. No current outpatient prescriptions on file.    6.   Allergies   Allergen Reactions   • Prednisone Other (See Comments)     Patient states that it make her crazy.   • Corticosteroids Mental Status Change                     -----------------------------------------------PHYSICAL EXAM----------------------------------------------    Vital Signs: /72  Ht 165.1 cm (65\")  Wt 85.3 kg (188 lb)  LMP 06/07/2017 (Exact Date)  BMI 31.28 kg/m2   Flowsheet Rows         First Filed Value    Admission Height  165.1 cm (65\") Documented at " 2018    Admission Weight  85.3 kg (188 lb) Documented at 2018 0914          Physical Exam   Constitutional: She is oriented to person, place, and time. She appears well-developed and well-nourished. No distress.   Abdominal: Soft. Bowel sounds are normal. She exhibits no distension and no mass. There is no tenderness. There is no rebound and no guarding. No hernia.   Musculoskeletal: Normal range of motion.   Neurological: She is alert and oriented to person, place, and time.   Skin: Skin is warm and dry. No rash noted. She is not diaphoretic. No erythema. No pallor.   Psychiatric: She has a normal mood and affect. Her behavior is normal. Judgment and thought content normal.   Nursing note and vitals reviewed.                          -----------------------------------------------MEDICAL DECISION MAKING-----------------------------  Risk counseling done:  yes    Results Reviewed:     1.   Lab Results (last 24 hours)     ** No results found for the last 24 hours. **        2.   Imaging Results (last 24 hours)     ** No results found for the last 24 hours. **        3.   ECG/EMG Results (most recent)     None          Old records reviewed?  no    Diagnoses and/or chronic conditions reviewed with pt:  Yasmin was seen today for postpartum care.    Diagnoses and all orders for this visit:    Mild preeclampsia, third trimester      PLAN: postpartum bp check.      Patient Active Problem List   Diagnosis   • GERD (gastroesophageal reflux disease)   • Hyperemesis affecting pregnancy, antepartum   • Mild preeclampsia, third trimester   •  (spontaneous vaginal delivery)   • Vaginal bleeding   • Postpartum care following vaginal delivery         Labs/imaging ordered: none    RTO Return in about 5 weeks (around 2018) for final pp exam.      Brian Sarmiento MD  9:39 AM  18

## 2018-04-05 ENCOUNTER — POSTPARTUM VISIT (OUTPATIENT)
Dept: OBSTETRICS AND GYNECOLOGY | Facility: CLINIC | Age: 23
End: 2018-04-05

## 2018-04-05 VITALS
BODY MASS INDEX: 29.09 KG/M2 | DIASTOLIC BLOOD PRESSURE: 77 MMHG | WEIGHT: 174.6 LBS | HEIGHT: 65 IN | SYSTOLIC BLOOD PRESSURE: 108 MMHG

## 2018-04-05 DIAGNOSIS — Z01.419 PAP SMEAR, AS PART OF ROUTINE GYNECOLOGICAL EXAMINATION: ICD-10-CM

## 2018-04-05 DIAGNOSIS — D50.9 IRON DEFICIENCY ANEMIA, UNSPECIFIED IRON DEFICIENCY ANEMIA TYPE: ICD-10-CM

## 2018-04-05 DIAGNOSIS — Z13.9 SCREENING FOR CONDITION: Primary | ICD-10-CM

## 2018-04-05 DIAGNOSIS — Z30.015 ENCOUNTER FOR INITIAL PRESCRIPTION OF VAGINAL RING HORMONAL CONTRACEPTIVE: ICD-10-CM

## 2018-04-05 LAB
B-HCG UR QL: NEGATIVE
BASOPHILS # BLD MANUAL: 0 10*3/MM3 (ref 0–0.2)
BASOPHILS NFR BLD MANUAL: 0 % (ref 0–2)
BILIRUB BLD-MCNC: NEGATIVE MG/DL
CLARITY, POC: CLEAR
COLOR UR: YELLOW
DIFFERENTIAL COMMENT: ABNORMAL
EOSINOPHIL # BLD MANUAL: 0.04 10*3/MM3 (ref 0.1–0.3)
EOSINOPHIL NFR BLD MANUAL: 1 % (ref 0–4)
ERYTHROCYTE [DISTWIDTH] IN BLOOD BY AUTOMATED COUNT: 13 % (ref 11.5–14.5)
GLUCOSE UR STRIP-MCNC: NEGATIVE MG/DL
HCT VFR BLD AUTO: 38.9 % (ref 37–47)
HGB BLD-MCNC: 13.1 G/DL (ref 12–16)
INTERNAL NEGATIVE CONTROL: NEGATIVE
INTERNAL POSITIVE CONTROL: POSITIVE
KETONES UR QL: NEGATIVE
LEUKOCYTE EST, POC: NEGATIVE
LYMPHOCYTES # BLD MANUAL: 1.19 10*3/MM3 (ref 0.6–4.8)
LYMPHOCYTES NFR BLD MANUAL: 32 % (ref 20–45)
Lab: NORMAL
MCH RBC QN AUTO: 28.9 PG (ref 27–31)
MCHC RBC AUTO-ENTMCNC: 33.7 G/DL (ref 31–37)
MCV RBC AUTO: 85.9 FL (ref 81–99)
MONOCYTES # BLD MANUAL: 0.48 10*3/MM3 (ref 0–1)
MONOCYTES NFR BLD MANUAL: 13 % (ref 3–8)
NEUTROPHILS # BLD MANUAL: 1.6 10*3/MM3 (ref 1.5–8.3)
NEUTROPHILS NFR BLD MANUAL: 43 % (ref 45–70)
NITRITE UR-MCNC: NEGATIVE MG/ML
PH UR: 5 [PH] (ref 5–8)
PLATELET # BLD AUTO: 264 10*3/MM3 (ref 140–500)
PLATELET BLD QL SMEAR: ABNORMAL
PROT UR STRIP-MCNC: NEGATIVE MG/DL
RBC # BLD AUTO: 4.53 10*6/MM3 (ref 4.2–5.4)
RBC # UR STRIP: NEGATIVE /UL
RBC MORPH BLD: ABNORMAL
SP GR UR: 1 (ref 1–1.03)
UROBILINOGEN UR QL: NORMAL
WBC # BLD AUTO: 3.72 10*3/MM3 (ref 4.8–10.8)

## 2018-04-05 PROCEDURE — 81025 URINE PREGNANCY TEST: CPT | Performed by: OBSTETRICS & GYNECOLOGY

## 2018-04-05 PROCEDURE — 99214 OFFICE O/P EST MOD 30 MIN: CPT | Performed by: OBSTETRICS & GYNECOLOGY

## 2018-04-05 RX ORDER — ETONOGESTREL AND ETHINYL ESTRADIOL 11.7; 2.7 MG/1; MG/1
INSERT, EXTENDED RELEASE VAGINAL
Qty: 1 EACH | Refills: 12 | Status: SHIPPED | OUTPATIENT
Start: 2018-04-05 | End: 2018-04-17 | Stop reason: ALTCHOICE

## 2018-04-05 NOTE — PROGRESS NOTES
"Chief Complaint   Patient presents with   • Postpartum Care   • Contraception        Date of Delivery: 18  Type of Delivery:  Spontaneous Vaginal Delivery  Infant:  female, \" Sejal\" in NICU for 7 days due to TTN  Postpartum complications: mild preeclampsia, received Mag Sulfate, had heavy VB 2 weeks postpartum    HPI  The patient feels well. Patient describes her bleeding as no bleeding. She has already had sex after delivery, used condoms. Does not want IUD, interested in NuvaRing. Denies depression. Infant doing well.     Breastfeeding: declines.    Review of Systems   Constitutional: Negative for appetite change, fatigue, fever and unexpected weight change.   Respiratory: Negative for cough and shortness of breath.    Cardiovascular: Negative for chest pain and palpitations.   Gastrointestinal: Negative for abdominal distention, abdominal pain, constipation, diarrhea and nausea.   Endocrine: Negative for cold intolerance and heat intolerance.   Genitourinary: Negative for dyspareunia, dysuria, menstrual problem, pelvic pain and vaginal discharge.   Skin: Negative for color change and rash.   Neurological: Negative for headaches.   Psychiatric/Behavioral: Positive for sleep disturbance ( sleep schedule). Negative for dysphoric mood. The patient is not nervous/anxious.        The following portions of the patient's history were reviewed and updated as appropriate: allergies, current medications and problem list.             /77   Ht 165.1 cm (65\")   Wt 79.2 kg (174 lb 9.6 oz)   Breastfeeding? No   BMI 29.05 kg/m²       Physical Exam   Constitutional: She is oriented to person, place, and time. She appears well-developed and well-nourished.   HENT:   Head: Normocephalic and atraumatic.   Neck: Neck supple. No thyromegaly present.   Cardiovascular: Normal rate, regular rhythm and normal heart sounds.    Pulmonary/Chest: Effort normal and breath sounds normal. Right breast exhibits no inverted " nipple, no mass, no nipple discharge, no skin change and no tenderness. Left breast exhibits no inverted nipple, no mass, no nipple discharge, no skin change and no tenderness.   Abdominal: Soft. Bowel sounds are normal. She exhibits no distension and no mass. There is no tenderness. There is no rebound and no guarding. No hernia.   Genitourinary: Pelvic exam was performed with patient supine. There is no rash, tenderness, lesion or injury on the right labia. There is no rash, tenderness, lesion or injury on the left labia. Uterus is not deviated, not enlarged, not fixed and not tender. Cervix exhibits no motion tenderness, no discharge and no friability. Right adnexum displays no mass, no tenderness and no fullness. Left adnexum displays no mass, no tenderness and no fullness. No erythema, tenderness or bleeding in the vagina. No foreign body in the vagina. No signs of injury around the vagina. No vaginal discharge found.   Neurological: She is alert and oriented to person, place, and time.   Skin: Skin is warm and dry.   Psychiatric: She has a normal mood and affect. Her behavior is normal. Judgment and thought content normal.   Nursing note and vitals reviewed.                1) 6 week postpartum exam - check pap/C/G/t  2) Screen for postpartum depression - denies any issues.   3) Contraception - Rx nuvaring. Start on next cycle. Enc condoms.   4) Social - doing well  5) Return to office - 1 year or prn.   6) Preeclampsia- BP normal, no meds.         Lila Perez MD  4/5/2018  1:56 PM

## 2018-04-10 LAB
C TRACH RRNA CVX QL NAA+PROBE: NEGATIVE
CONV .: NORMAL
CYTOLOGIST CVX/VAG CYTO: NORMAL
CYTOLOGY CVX/VAG DOC THIN PREP: NORMAL
DX ICD CODE: NORMAL
HIV 1 & 2 AB SER-IMP: NORMAL
Lab: NORMAL
N GONORRHOEA RRNA CVX QL NAA+PROBE: NEGATIVE
OTHER STN SPEC: NORMAL
PATH REPORT.FINAL DX SPEC: NORMAL
STAT OF ADQ CVX/VAG CYTO-IMP: NORMAL
T VAGINALIS RRNA SPEC QL NAA+PROBE: NEGATIVE

## 2018-04-17 ENCOUNTER — TELEPHONE (OUTPATIENT)
Dept: OBSTETRICS AND GYNECOLOGY | Facility: CLINIC | Age: 23
End: 2018-04-17

## 2018-04-17 RX ORDER — LEVONORGESTREL AND ETHINYL ESTRADIOL 0.1-0.02MG
1 KIT ORAL DAILY
Qty: 28 TABLET | Refills: 12 | Status: SHIPPED | OUTPATIENT
Start: 2018-04-17 | End: 2019-04-08 | Stop reason: SDUPTHER

## 2019-04-08 ENCOUNTER — OFFICE VISIT (OUTPATIENT)
Dept: OBSTETRICS AND GYNECOLOGY | Facility: CLINIC | Age: 24
End: 2019-04-08

## 2019-04-08 VITALS
BODY MASS INDEX: 27.69 KG/M2 | HEIGHT: 65 IN | SYSTOLIC BLOOD PRESSURE: 110 MMHG | WEIGHT: 166.2 LBS | DIASTOLIC BLOOD PRESSURE: 72 MMHG

## 2019-04-08 DIAGNOSIS — Z11.51 SPECIAL SCREENING EXAMINATION FOR HUMAN PAPILLOMAVIRUS (HPV): ICD-10-CM

## 2019-04-08 DIAGNOSIS — Z01.419 WELL WOMAN EXAM WITH ROUTINE GYNECOLOGICAL EXAM: Primary | ICD-10-CM

## 2019-04-08 DIAGNOSIS — Z11.3 SCREEN FOR STD (SEXUALLY TRANSMITTED DISEASE): ICD-10-CM

## 2019-04-08 DIAGNOSIS — Z30.8 ENCOUNTER FOR OTHER CONTRACEPTIVE MANAGEMENT: ICD-10-CM

## 2019-04-08 PROBLEM — N93.9 VAGINAL BLEEDING: Status: RESOLVED | Noted: 2018-02-23 | Resolved: 2019-04-08

## 2019-04-08 PROBLEM — O21.0 HYPEREMESIS AFFECTING PREGNANCY, ANTEPARTUM: Status: RESOLVED | Noted: 2018-02-06 | Resolved: 2019-04-08

## 2019-04-08 LAB
B-HCG UR QL: NEGATIVE
BILIRUB BLD-MCNC: NEGATIVE MG/DL
CLARITY, POC: CLEAR
COLOR UR: YELLOW
GLUCOSE UR STRIP-MCNC: NEGATIVE MG/DL
INTERNAL NEGATIVE CONTROL: NEGATIVE
INTERNAL POSITIVE CONTROL: POSITIVE
KETONES UR QL: NEGATIVE
LEUKOCYTE EST, POC: NEGATIVE
Lab: NORMAL
NITRITE UR-MCNC: NEGATIVE MG/ML
PH UR: 5 [PH] (ref 5–8)
PROT UR STRIP-MCNC: NEGATIVE MG/DL
RBC # UR STRIP: NEGATIVE /UL
SP GR UR: 1 (ref 1–1.03)
UROBILINOGEN UR QL: NORMAL

## 2019-04-08 PROCEDURE — 99395 PREV VISIT EST AGE 18-39: CPT | Performed by: OBSTETRICS & GYNECOLOGY

## 2019-04-08 PROCEDURE — 81025 URINE PREGNANCY TEST: CPT | Performed by: OBSTETRICS & GYNECOLOGY

## 2019-04-08 PROCEDURE — 81002 URINALYSIS NONAUTO W/O SCOPE: CPT | Performed by: OBSTETRICS & GYNECOLOGY

## 2019-04-08 RX ORDER — LEVONORGESTREL AND ETHINYL ESTRADIOL 0.1-0.02MG
1 KIT ORAL DAILY
Qty: 28 TABLET | Refills: 6 | Status: SHIPPED | OUTPATIENT
Start: 2019-04-08 | End: 2020-04-07

## 2019-04-08 NOTE — PROGRESS NOTES
GYN Annual Exam     CC- Here for annual exam.     Yasmin SORIA is a 23 y.o. female established patient who presents for annual well woman exam. Periods are regular every 28-30 days, lasting 4 days. She is moving to [facility] on 19 to be closer to her step daughter. She is considering a BTO but we discussed risk of regret and she wants to restart nexplanon. She has had 2 previously and has done well with them.     OB History      Para Term  AB Living    2 1 1   1 1    SAB TAB Ectopic Molar Multiple Live Births    1       0 1        Obstetric Comments    1  preeclampsia  1 SAB no D&C          Menarche: 12  Current contraception: OCP (estrogen/progesterone)  History of abnormal Pap smear: no  History of abnormal mammogram: no  Family history of uterine, colon or ovarian cancer: no  Family history of breast cancer: no  H/o STDs: none  Gardasil: 3/3  Last pap:2018  Gardasil:completed  KATHARINE: none    Health Maintenance   Topic Date Due   • ANNUAL PHYSICAL  1997   • HPV VACCINES (1 - Female 3-dose series) 2009   • INFLUENZA VACCINE  2019   • PAP SMEAR  2021   • TDAP/TD VACCINES (2 - Td) 2027       Past Medical History:   Diagnosis Date   • GERD (gastroesophageal reflux disease) 2017   • Preeclampsia     in pergnancy       Past Surgical History:   Procedure Laterality Date   • CHOLECYSTECTOMY     • WISDOM TOOTH EXTRACTION           Current Outpatient Medications:   •  levonorgestrel-ethinyl estradiol (AVIANE,ALESSE,LESSINA) 0.1-20 MG-MCG per tablet, Take 1 tablet by mouth Daily., Disp: 28 tablet, Rfl: 6    Allergies   Allergen Reactions   • Prednisone Other (See Comments)     Patient states that it make her crazy.   • Corticosteroids Mental Status Change       Social History     Tobacco Use   • Smoking status: Never Smoker   • Smokeless tobacco: Never Used   Substance Use Topics   • Alcohol use: No   • Drug use: No       Family History   Problem Relation Age of Onset   •  "No Known Problems Father    • No Known Problems Mother    • No Known Problems Brother    • No Known Problems Sister    • Breast cancer Neg Hx    • Ovarian cancer Neg Hx    • Colon cancer Neg Hx    • Deep vein thrombosis Neg Hx    • Pulmonary embolism Neg Hx        Review of Systems   Constitutional: Negative for appetite change, fatigue, fever and unexpected weight change.   Eyes: Negative for photophobia and visual disturbance.   Respiratory: Negative for cough and shortness of breath.    Cardiovascular: Negative for chest pain and palpitations.   Gastrointestinal: Negative for abdominal distention, abdominal pain, constipation, diarrhea and nausea.   Endocrine: Negative for cold intolerance and heat intolerance.   Genitourinary: Negative for dyspareunia, dysuria, menstrual problem, pelvic pain and vaginal discharge.   Musculoskeletal: Negative for back pain.   Skin: Negative for color change and rash.   Neurological: Negative for headaches.   Hematological: Negative for adenopathy. Does not bruise/bleed easily.   Psychiatric/Behavioral: Negative for dysphoric mood. The patient is not nervous/anxious.        /72   Ht 165.1 cm (65\")   Wt 75.4 kg (166 lb 3.2 oz)   LMP 03/25/2019 (Approximate)   Breastfeeding? No   BMI 27.66 kg/m²     Physical Exam   Constitutional: She is oriented to person, place, and time. She appears well-developed and well-nourished.   HENT:   Head: Normocephalic and atraumatic.   Eyes: Conjunctivae are normal. No scleral icterus.   Neck: Neck supple. No thyromegaly present.   Cardiovascular: Normal rate and regular rhythm.   Pulmonary/Chest: Effort normal and breath sounds normal. Right breast exhibits no inverted nipple, no mass, no nipple discharge, no skin change and no tenderness. Left breast exhibits no inverted nipple, no mass, no nipple discharge, no skin change and no tenderness.   Abdominal: Soft. Bowel sounds are normal. She exhibits no distension and no mass. There is no " tenderness. There is no rebound and no guarding. No hernia.   Genitourinary: Pelvic exam was performed with patient supine. There is no rash, tenderness or lesion on the right labia. There is no rash, tenderness or lesion on the left labia. Uterus is not deviated, not enlarged, not fixed and not tender. Cervix exhibits no motion tenderness, no discharge and no friability. Right adnexum displays no mass, no tenderness and no fullness. Left adnexum displays no mass, no tenderness and no fullness. No erythema, tenderness or bleeding in the vagina. No foreign body in the vagina. No signs of injury around the vagina. No vaginal discharge found.   Neurological: She is alert and oriented to person, place, and time.   Skin: Skin is warm and dry.   Psychiatric: She has a normal mood and affect. Her behavior is normal. Judgment and thought content normal.   Nursing note and vitals reviewed.         Assessment/Plan    1) GYN HM: pap/G/C/T  SBE demonstrated and encouraged.  2) STD screening: accepts Condoms encouraged.  3) Contraception: OCP (estrogen/progesterone) at present. Discussed with patient at length risk, benefits and alternatives to all contraceptive options, including oral contraceptive pills (both combination and progesterone only), vaginal rings, patches, Dep Provera, condoms, diaphragm, cervical caps, as well as long active but reversible forms such as Nexplanon and all IUD’s.  Differences in birth control and cycle control between methods were outlined along with correct usage for each method.  After discussion, the patient is most interest in Nexplanon. Will try to have it placed ASAP as she is getting ready to move  4) Family Planning: no plans ever, encourage folic acid daily  5) Diet and Exercise discussed  6) Smoking Status: No  7) Social: moving to [facility], sign ROR  8) MMG- plan age 39  9)Follow up prn or 1 year       Yasmin was seen today for gynecologic exam.    Diagnoses and all orders for this  visit:    Well woman exam with routine gynecological exam  -     POC Pregnancy, Urine  -     POC Urinalysis Dipstick  -     Pap IG, Ct-Ng TV Rfx HPV ASCU    Special screening examination for human papillomavirus (HPV)  -     Pap IG, Ct-Ng TV Rfx HPV ASCU    Screen for STD (sexually transmitted disease)  -     Hepatitis B Surface Antigen  -     Hepatitis C Antibody  -     HIV-1 / O / 2 Ag / Antibody 4th Generation  -     HSV 1 & 2 - Specific Antibody, IgG  -     RPR, Rfx Qn RPR / Confirm TP    Encounter for other contraceptive management    Other orders  -     levonorgestrel-ethinyl estradiol (AVIANE,ALESSE,LESSINA) 0.1-20 MG-MCG per tablet; Take 1 tablet by mouth Daily.          Lila Perez MD  4/8/2019  1:54 PM

## 2019-04-09 LAB
HBV SURFACE AG SERPL QL IA: NEGATIVE
HCV AB S/CO SERPL IA: <0.1 S/CO RATIO (ref 0–0.9)
HIV 1+2 AB+HIV1 P24 AG SERPL QL IA: NON REACTIVE
HSV1 IGG SER IA-ACNC: 52.1 INDEX (ref 0–0.9)
HSV2 IGG SER IA-ACNC: <0.91 INDEX (ref 0–0.9)
RPR SER QL: NON REACTIVE

## 2019-04-09 RX ORDER — VALACYCLOVIR HYDROCHLORIDE 500 MG/1
500 TABLET, FILM COATED ORAL DAILY
Qty: 90 TABLET | Refills: 3 | Status: SHIPPED | OUTPATIENT
Start: 2019-04-09 | End: 2019-07-08

## 2019-04-12 LAB
C TRACH RRNA CVX QL NAA+PROBE: NEGATIVE
CONV .: ABNORMAL
CYTOLOGIST CVX/VAG CYTO: ABNORMAL
CYTOLOGY CVX/VAG DOC THIN PREP: ABNORMAL
DX ICD CODE: ABNORMAL
DX ICD CODE: ABNORMAL
HIV 1 & 2 AB SER-IMP: ABNORMAL
HPV I/H RISK 1 DNA CVX QL PROBE+SIG AMP: NEGATIVE
N GONORRHOEA RRNA CVX QL NAA+PROBE: NEGATIVE
OTHER STN SPEC: ABNORMAL
PATH REPORT.FINAL DX SPEC: ABNORMAL
PATHOLOGIST CVX/VAG CYTO: ABNORMAL
RECOM F/U CVX/VAG CYTO: ABNORMAL
STAT OF ADQ CVX/VAG CYTO-IMP: ABNORMAL
T VAGINALIS RRNA SPEC QL NAA+PROBE: NEGATIVE

## 2019-04-18 ENCOUNTER — PROCEDURE VISIT (OUTPATIENT)
Dept: OBSTETRICS AND GYNECOLOGY | Facility: CLINIC | Age: 24
End: 2019-04-18

## 2019-04-18 VITALS
BODY MASS INDEX: 27.99 KG/M2 | WEIGHT: 168 LBS | HEIGHT: 65 IN | SYSTOLIC BLOOD PRESSURE: 120 MMHG | DIASTOLIC BLOOD PRESSURE: 72 MMHG

## 2019-04-18 DIAGNOSIS — Z30.017 NEXPLANON INSERTION: Primary | ICD-10-CM

## 2019-04-18 LAB
B-HCG UR QL: NEGATIVE
INTERNAL NEGATIVE CONTROL: NEGATIVE
INTERNAL POSITIVE CONTROL: POSITIVE
Lab: NORMAL

## 2019-04-18 PROCEDURE — 81025 URINE PREGNANCY TEST: CPT | Performed by: OBSTETRICS & GYNECOLOGY

## 2019-04-18 PROCEDURE — 11981 INSERTION DRUG DLVR IMPLANT: CPT | Performed by: OBSTETRICS & GYNECOLOGY

## 2019-04-18 NOTE — PROGRESS NOTES
Nexplanon Insertion:    Procedures      Pre Dx: 1) Nexplanon Insertion   Post Dx: 1) Nexplanon Insertion  Urine pregnancy test was negative.  Patient is not on her cycle. She is due to start tomorrow. She needs to have Nexplanon placed now as she is moving soon.   Pt currently uses:OCP (estrogen/progesterone)    Risks, benefits, alternatives explained. All questions answered. Consents signed.  The area for insertion prepped with betadine in a sterile fashion. About 3 mL of 1% lidocaine.     The insertion site was identified approximately 6-8 cm proximal to the elbow crease in the underside of the left upper  arm overlying the groove between the biceps and triceps muscles. The skin at the insertion site was stretched by my thumb and index finger. Then inserted the needle tip, bevel side up, at an angle not greater than 20° to the skin surface, just until the skin was penetrated. The applicator was then lowered so that it was parallel to the arm. To minimize the chance of deep incision, the skin was tented upwards with the tip of the needle. The needle was then gently inserted to the full length. Then fixed the device in place on the arm with one hand. I then slowly and carefully retracted the needle cannula back along the length of the device after pushing the release button. The obturator was seen in the device to determine that the nexplanon had been released.     Both the patient and I were easily able to feel the device under the skin. Steri-Strips were applied at the site, and the arm was gently wrapped with gauze. Pt instructed to keep bandage on for 12-24 hrs.    There were no complications.   The patient tolerated the procedure well.     She was given a reminder card. She was advised to use condoms as a backup method for at least a week after insertion.    Expectations, warning signs and limitations reviewed.     Lila Perez MD    4/18/2019  2:17 PM

## 2023-04-15 NOTE — PROGRESS NOTES
OB follow up     Yasmin Escalante is a 22 y.o.  9w0d being seen today for her obstetrical visit.  Patient reports nausea and vomiting. Fetal movement: absent.  Recent discharge from the hospital for nausea vomiting and dehydration.  She just got her diet approved through her insurance and her first dose was last night.  She mainly vomits in the morning but is well-controlled throughout the day with her Zofran.  Today she had an ultrasound.    Review of Systems  No bleeding, No cramping/contractions     /60  Wt 159 lb (72.1 kg)  LMP 2017 (Exact Date)  BMI 26.46 kg/m2    FHT: present BPM   Uterine Size: 9 cm       Assessment/Plan:    1) 22 y.o.  -pregnancy at 9w0d    2)   Encounter Diagnosis   Name Primary?   • Hyperemesis gravidarum before end of 22 week gestation, dehydration Yes   Just started her dye clear just last night.  She has a 6 pound weight loss but seems to be turning the corner.  She has follow-up scheduled for  for a Alburtis test.    3) Reviewed this stage of pregnancy  4) Problem list updated     No Follow-up on file.      Jeremías Ramirez MD    2017  9:56 AM   good minus

## 2023-09-20 NOTE — PROGRESS NOTES
UDS,AFP   What Type Of Note Output Would You Prefer (Optional)?: Standard Output How Severe Is Your Acne?: moderate Is This A New Presentation, Or A Follow-Up?: Acne Additional Comments (Use Complete Sentences): Patient c/o small bumps on bottom
